# Patient Record
Sex: FEMALE | Race: WHITE | NOT HISPANIC OR LATINO | Employment: OTHER | URBAN - NONMETROPOLITAN AREA
[De-identification: names, ages, dates, MRNs, and addresses within clinical notes are randomized per-mention and may not be internally consistent; named-entity substitution may affect disease eponyms.]

---

## 2017-02-19 ENCOUNTER — APPOINTMENT (OUTPATIENT)
Dept: CT IMAGING | Facility: HOSPITAL | Age: 82
End: 2017-02-19

## 2017-02-19 ENCOUNTER — HOSPITAL ENCOUNTER (EMERGENCY)
Facility: HOSPITAL | Age: 82
Discharge: HOME | End: 2017-02-19
Attending: EMERGENCY MEDICINE | Admitting: EMERGENCY MEDICINE

## 2017-02-19 VITALS
SYSTOLIC BLOOD PRESSURE: 146 MMHG | RESPIRATION RATE: 18 BRPM | BODY MASS INDEX: 29.02 KG/M2 | TEMPERATURE: 97.9 F | WEIGHT: 170 LBS | HEART RATE: 82 BPM | DIASTOLIC BLOOD PRESSURE: 85 MMHG | HEIGHT: 64 IN | OXYGEN SATURATION: 96 %

## 2017-02-19 DIAGNOSIS — R20.0 NUMBNESS OF LEFT HAND: Primary | ICD-10-CM

## 2017-02-19 LAB
ANION GAP SERPL CALCULATED.3IONS-SCNC: 13.2 MMOL/L
BASOPHILS # BLD AUTO: 0.04 10*3/MM3 (ref 0–0.2)
BASOPHILS NFR BLD AUTO: 0.6 % (ref 0–2.5)
BUN BLD-MCNC: 13 MG/DL (ref 7–20)
BUN/CREAT SERPL: 14.4 (ref 7.1–23.5)
CALCIUM SPEC-SCNC: 9.2 MG/DL (ref 8.4–10.2)
CHLORIDE SERPL-SCNC: 103 MMOL/L (ref 98–107)
CO2 SERPL-SCNC: 29 MMOL/L (ref 26–30)
CREAT BLD-MCNC: 0.9 MG/DL (ref 0.6–1.3)
DEPRECATED RDW RBC AUTO: 43.7 FL (ref 37–54)
EOSINOPHIL # BLD AUTO: 0.09 10*3/MM3 (ref 0–0.7)
EOSINOPHIL NFR BLD AUTO: 1.4 % (ref 0–7)
ERYTHROCYTE [DISTWIDTH] IN BLOOD BY AUTOMATED COUNT: 12.8 % (ref 11.5–14.5)
GFR SERPL CREATININE-BSD FRML MDRD: 59 ML/MIN/1.73
GLUCOSE BLD-MCNC: 115 MG/DL (ref 74–98)
HCT VFR BLD AUTO: 38.6 % (ref 37–47)
HGB BLD-MCNC: 12.8 G/DL (ref 12–16)
HOLD SPECIMEN: NORMAL
HOLD SPECIMEN: NORMAL
IMM GRANULOCYTES # BLD: 0.03 10*3/MM3 (ref 0–0.06)
IMM GRANULOCYTES NFR BLD: 0.5 % (ref 0–0.6)
LYMPHOCYTES # BLD AUTO: 1.5 10*3/MM3 (ref 0.6–3.4)
LYMPHOCYTES NFR BLD AUTO: 23.4 % (ref 10–50)
MCH RBC QN AUTO: 30.8 PG (ref 27–31)
MCHC RBC AUTO-ENTMCNC: 33.2 G/DL (ref 30–37)
MCV RBC AUTO: 93 FL (ref 81–99)
MONOCYTES # BLD AUTO: 0.71 10*3/MM3 (ref 0–0.9)
MONOCYTES NFR BLD AUTO: 11.1 % (ref 0–12)
NEUTROPHILS # BLD AUTO: 4.03 10*3/MM3 (ref 2–6.9)
NEUTROPHILS NFR BLD AUTO: 63 % (ref 37–80)
NRBC BLD MANUAL-RTO: 0 /100 WBC (ref 0–0)
PLATELET # BLD AUTO: 294 10*3/MM3 (ref 130–400)
PMV BLD AUTO: 9.6 FL (ref 6–12)
POTASSIUM BLD-SCNC: 4.2 MMOL/L (ref 3.5–5.1)
RBC # BLD AUTO: 4.15 10*6/MM3 (ref 4.2–5.4)
SODIUM BLD-SCNC: 141 MMOL/L (ref 137–145)
TROPONIN I SERPL-MCNC: <0.012 NG/ML (ref 0–0.03)
WBC NRBC COR # BLD: 6.4 10*3/MM3 (ref 4.8–10.8)
WHOLE BLOOD HOLD SPECIMEN: NORMAL
WHOLE BLOOD HOLD SPECIMEN: NORMAL

## 2017-02-19 PROCEDURE — 99283 EMERGENCY DEPT VISIT LOW MDM: CPT

## 2017-02-19 PROCEDURE — 93005 ELECTROCARDIOGRAM TRACING: CPT | Performed by: EMERGENCY MEDICINE

## 2017-02-19 PROCEDURE — 84484 ASSAY OF TROPONIN QUANT: CPT | Performed by: EMERGENCY MEDICINE

## 2017-02-19 PROCEDURE — 70450 CT HEAD/BRAIN W/O DYE: CPT

## 2017-02-19 PROCEDURE — 85025 COMPLETE CBC W/AUTO DIFF WBC: CPT | Performed by: EMERGENCY MEDICINE

## 2017-02-19 PROCEDURE — 80048 BASIC METABOLIC PNL TOTAL CA: CPT | Performed by: EMERGENCY MEDICINE

## 2017-02-19 RX ORDER — LOSARTAN POTASSIUM 50 MG/1
50 TABLET ORAL DAILY
COMMUNITY
Start: 2013-03-06

## 2017-02-19 RX ORDER — PILOCARPINE HYDROCHLORIDE 10 MG/ML
1 SOLUTION/ DROPS OPHTHALMIC 4 TIMES DAILY
COMMUNITY

## 2017-02-19 RX ORDER — DILTIAZEM HYDROCHLORIDE 180 MG/1
180 CAPSULE, EXTENDED RELEASE ORAL DAILY
COMMUNITY
Start: 2013-03-19

## 2017-02-19 RX ORDER — FLUTICASONE PROPIONATE 50 MCG
1 SPRAY, SUSPENSION (ML) NASAL AS NEEDED
COMMUNITY
Start: 2013-04-23 | End: 2018-07-02

## 2017-02-19 RX ORDER — DABIGATRAN ETEXILATE 150 MG/1
150 CAPSULE ORAL DAILY
COMMUNITY
Start: 2013-03-27

## 2017-02-19 RX ORDER — PRAVASTATIN SODIUM 40 MG
20 TABLET ORAL DAILY
COMMUNITY
Start: 2013-09-24

## 2017-02-19 RX ORDER — LEVOTHYROXINE SODIUM 0.05 MG/1
50 TABLET ORAL DAILY
COMMUNITY
Start: 2013-03-01

## 2017-02-19 RX ORDER — CARVEDILOL 6.25 MG/1
6.25 TABLET ORAL 2 TIMES DAILY
COMMUNITY
Start: 2013-04-09

## 2017-02-19 RX ORDER — ALPRAZOLAM 0.25 MG/1
0.25 TABLET ORAL NIGHTLY PRN
COMMUNITY
Start: 2014-01-28

## 2017-02-19 NOTE — ED PROVIDER NOTES
Subjective   History of Present Illness   87-year-old female with a history of atrial fibrillation on Primaxin diltiazem here after her left hand to sleep.  Patient states she was sitting in a chair and felt normal prior to this, then afterwards her hand went limp.  She had difficulty moving it.  No associated numbness.  No other symptoms including no leg weakness or numbness, facial droop, didn't really speaking, or other concerns.  This lasted only a couple of minutes prior to improving.  Her family is very concerned about possible stroke given she has a strong family history for this.  Currently patient has no complaints.    Review of Systems   Neurological: Positive for weakness.   All other systems reviewed and are negative.      Past Medical History   Diagnosis Date   • Atrial fibrillation    • Disease of thyroid gland    • Hyperlipidemia    • Hypertension        Allergies   Allergen Reactions   • No Known Drug Allergy        Past Surgical History   Procedure Laterality Date   • Cyst removal       L breat and L ovary       History reviewed. No pertinent family history.    Social History     Social History   • Marital status:      Spouse name: N/A   • Number of children: N/A   • Years of education: N/A     Social History Main Topics   • Smoking status: Never Smoker   • Smokeless tobacco: None   • Alcohol use No   • Drug use: No   • Sexual activity: Not Asked     Other Topics Concern   • None     Social History Narrative   • None           Objective   Physical Exam   Constitutional: She is oriented to person, place, and time. She appears well-developed and well-nourished. No distress.   HENT:   Head: Normocephalic.   Mouth/Throat: Oropharynx is clear and moist. No oropharyngeal exudate.   Eyes: Conjunctivae are normal. No scleral icterus.   Neck: Neck supple. No tracheal deviation present.   Cardiovascular: Normal rate, regular rhythm, normal heart sounds and intact distal pulses.  Exam reveals no gallop  and no friction rub.    No murmur heard.  Pulmonary/Chest: Effort normal and breath sounds normal. No stridor. No respiratory distress. She has no wheezes. She has no rales. She exhibits no tenderness.   Abdominal: Soft. She exhibits no distension and no mass. There is no tenderness. There is no rebound and no guarding. No hernia.   Musculoskeletal: She exhibits no edema or deformity.   Neurological: She is alert and oriented to person, place, and time. No cranial nerve deficit.   NIH SS = 0   Skin: Skin is warm and dry. She is not diaphoretic. No erythema. No pallor.   Psychiatric: She has a normal mood and affect. Her behavior is normal.   Nursing note and vitals reviewed.      Procedures         ED Course  ED Course                  MDM   87-year-old female here with this in her left hand that was very transient nature and occurred after she had been sitting in a chair with her hand propped up on the rail.  Likely she had transient weakness secondary to decreased blood supply.  Unlikely she had a TIA given the Siegler nature of her symptoms and the short duration.  Discussed that this is still a possibility the family and that a complete workup would require an MRI and likely admission for further observation, however, they agreed to this is unlikely and preferred to get a CT scan to evaluate for bleeding, basic lab work and this is reassuring that would prefer discharge home with outpatient follow-up with a neurologist.      EKG: No ischemic changes, atrial fibrillation.    10:27 AM labs are unremarkable, CT scan shows no acute intracranial abnormality.  Discussed again that we cannot fully rule out possible stroke in this patient of the risk is likely low given her isolated symptoms.  Patient family comfortable with plan for discharge home.  Discussed strict return to care precautions.    Final diagnoses:   Numbness of left hand            Chris Sue MD  02/19/17 8284

## 2017-02-24 ENCOUNTER — APPOINTMENT (OUTPATIENT)
Dept: GENERAL RADIOLOGY | Facility: HOSPITAL | Age: 82
End: 2017-02-24

## 2017-02-24 ENCOUNTER — HOSPITAL ENCOUNTER (EMERGENCY)
Facility: HOSPITAL | Age: 82
Discharge: HOME | End: 2017-02-24
Attending: EMERGENCY MEDICINE | Admitting: EMERGENCY MEDICINE

## 2017-02-24 ENCOUNTER — APPOINTMENT (OUTPATIENT)
Dept: CT IMAGING | Facility: HOSPITAL | Age: 82
End: 2017-02-24

## 2017-02-24 VITALS
TEMPERATURE: 97.9 F | BODY MASS INDEX: 29.02 KG/M2 | DIASTOLIC BLOOD PRESSURE: 78 MMHG | RESPIRATION RATE: 18 BRPM | WEIGHT: 170 LBS | SYSTOLIC BLOOD PRESSURE: 147 MMHG | OXYGEN SATURATION: 94 % | HEIGHT: 64 IN | HEART RATE: 87 BPM

## 2017-02-24 DIAGNOSIS — R53.82 CHRONIC FATIGUE: Primary | ICD-10-CM

## 2017-02-24 LAB
ALBUMIN SERPL-MCNC: 4.1 G/DL (ref 3.5–5)
ALBUMIN/GLOB SERPL: 1.2 G/DL (ref 1–2)
ALP SERPL-CCNC: 83 U/L (ref 38–126)
ALT SERPL W P-5'-P-CCNC: 16 U/L (ref 13–69)
AMPHET+METHAMPHET UR QL: NEGATIVE
AMPHETAMINES UR QL: NEGATIVE
AMYLASE SERPL-CCNC: 74 U/L (ref 30–110)
ANION GAP SERPL CALCULATED.3IONS-SCNC: 13 MMOL/L
ARTERIAL PATENCY WRIST A: POSITIVE
AST SERPL-CCNC: 24 U/L (ref 15–46)
BACTERIA UR QL AUTO: ABNORMAL /HPF
BARBITURATES UR QL SCN: NEGATIVE
BASE EXCESS BLDA CALC-SCNC: 3.2 MMOL/L
BASOPHILS # BLD AUTO: 0.05 10*3/MM3 (ref 0–0.2)
BASOPHILS NFR BLD AUTO: 0.6 % (ref 0–2.5)
BDY SITE: ABNORMAL
BENZODIAZ UR QL SCN: NEGATIVE
BILIRUB SERPL-MCNC: 0.5 MG/DL (ref 0.2–1.3)
BILIRUB UR QL STRIP: NEGATIVE
BUN BLD-MCNC: 15 MG/DL (ref 7–20)
BUN/CREAT SERPL: 18.8 (ref 7.1–23.5)
BUPRENORPHINE SERPL-MCNC: NEGATIVE NG/ML
CALCIUM SPEC-SCNC: 9.1 MG/DL (ref 8.4–10.2)
CANNABINOIDS SERPL QL: NEGATIVE
CHLORIDE SERPL-SCNC: 103 MMOL/L (ref 98–107)
CLARITY UR: CLEAR
CO2 SERPL-SCNC: 29 MMOL/L (ref 26–30)
COCAINE UR QL: NEGATIVE
COHGB MFR BLD: 1 %
COLOR UR: YELLOW
CREAT BLD-MCNC: 0.8 MG/DL (ref 0.6–1.3)
DEPRECATED RDW RBC AUTO: 44.4 FL (ref 37–54)
EOSINOPHIL # BLD AUTO: 0.16 10*3/MM3 (ref 0–0.7)
EOSINOPHIL NFR BLD AUTO: 2.1 % (ref 0–7)
ERYTHROCYTE [DISTWIDTH] IN BLOOD BY AUTOMATED COUNT: 13 % (ref 11.5–14.5)
GFR SERPL CREATININE-BSD FRML MDRD: 68 ML/MIN/1.73
GLOBULIN UR ELPH-MCNC: 3.4 GM/DL
GLUCOSE BLD-MCNC: 122 MG/DL (ref 74–98)
GLUCOSE UR STRIP-MCNC: NEGATIVE MG/DL
HCO3 BLDA-SCNC: 27.4 MMOL/L (ref 22–28)
HCT VFR BLD AUTO: 37.8 % (ref 37–47)
HGB BLD-MCNC: 12.1 G/DL (ref 12–16)
HGB BLDA-MCNC: 12.8 G/DL (ref 12–18)
HGB UR QL STRIP.AUTO: ABNORMAL
HOROWITZ INDEX BLD+IHG-RTO: 21 %
HYALINE CASTS UR QL AUTO: ABNORMAL /LPF
IMM GRANULOCYTES # BLD: 0.04 10*3/MM3 (ref 0–0.06)
IMM GRANULOCYTES NFR BLD: 0.5 % (ref 0–0.6)
KETONES UR QL STRIP: NEGATIVE
LEUKOCYTE ESTERASE UR QL STRIP.AUTO: NEGATIVE
LIPASE SERPL-CCNC: 58 U/L (ref 23–300)
LYMPHOCYTES # BLD AUTO: 1.87 10*3/MM3 (ref 0.6–3.4)
LYMPHOCYTES NFR BLD AUTO: 24.3 % (ref 10–50)
MCH RBC QN AUTO: 29.9 PG (ref 27–31)
MCHC RBC AUTO-ENTMCNC: 32 G/DL (ref 30–37)
MCV RBC AUTO: 93.3 FL (ref 81–99)
METHADONE UR QL SCN: NEGATIVE
METHGB BLD QL: 0.7 %
MODALITY: ABNORMAL
MONOCYTES # BLD AUTO: 0.71 10*3/MM3 (ref 0–0.9)
MONOCYTES NFR BLD AUTO: 9.2 % (ref 0–12)
NEUTROPHILS # BLD AUTO: 4.88 10*3/MM3 (ref 2–6.9)
NEUTROPHILS NFR BLD AUTO: 63.3 % (ref 37–80)
NITRITE UR QL STRIP: NEGATIVE
NRBC BLD MANUAL-RTO: 0 /100 WBC (ref 0–0)
OPIATES UR QL: NEGATIVE
OXYCODONE UR QL SCN: NEGATIVE
OXYHGB MFR BLDV: 91.7 % (ref 94–99)
PCO2 BLDA: 40.8 MM HG (ref 35–45)
PCP UR QL SCN: NEGATIVE
PH BLDA: 7.44 PH UNITS
PH UR STRIP.AUTO: 6 [PH] (ref 5–8)
PLATELET # BLD AUTO: 280 10*3/MM3 (ref 130–400)
PMV BLD AUTO: 9.5 FL (ref 6–12)
PO2 BLDA: 63.9 MM HG (ref 75–100)
POTASSIUM BLD-SCNC: 4 MMOL/L (ref 3.5–5.1)
PROPOXYPH UR QL: NEGATIVE
PROT SERPL-MCNC: 7.5 G/DL (ref 6.3–8.2)
PROT UR QL STRIP: NEGATIVE
RBC # BLD AUTO: 4.05 10*6/MM3 (ref 4.2–5.4)
RBC # UR: ABNORMAL /HPF
REF LAB TEST METHOD: ABNORMAL
SODIUM BLD-SCNC: 141 MMOL/L (ref 137–145)
SP GR UR STRIP: 1.02 (ref 1–1.03)
SQUAMOUS #/AREA URNS HPF: ABNORMAL /HPF
TRICYCLICS UR QL SCN: NEGATIVE
TROPONIN I SERPL-MCNC: <0.012 NG/ML (ref 0–0.03)
TSH SERPL DL<=0.05 MIU/L-ACNC: 1.79 MIU/ML (ref 0.47–4.68)
UROBILINOGEN UR QL STRIP: ABNORMAL
WBC NRBC COR # BLD: 7.71 10*3/MM3 (ref 4.8–10.8)
WBC UR QL AUTO: ABNORMAL /HPF

## 2017-02-24 PROCEDURE — 80306 DRUG TEST PRSMV INSTRMNT: CPT | Performed by: EMERGENCY MEDICINE

## 2017-02-24 PROCEDURE — 70450 CT HEAD/BRAIN W/O DYE: CPT

## 2017-02-24 PROCEDURE — 82375 ASSAY CARBOXYHB QUANT: CPT

## 2017-02-24 PROCEDURE — 83690 ASSAY OF LIPASE: CPT | Performed by: EMERGENCY MEDICINE

## 2017-02-24 PROCEDURE — 81001 URINALYSIS AUTO W/SCOPE: CPT | Performed by: EMERGENCY MEDICINE

## 2017-02-24 PROCEDURE — 36415 COLL VENOUS BLD VENIPUNCTURE: CPT

## 2017-02-24 PROCEDURE — 85025 COMPLETE CBC W/AUTO DIFF WBC: CPT | Performed by: EMERGENCY MEDICINE

## 2017-02-24 PROCEDURE — 83050 HGB METHEMOGLOBIN QUAN: CPT

## 2017-02-24 PROCEDURE — 99284 EMERGENCY DEPT VISIT MOD MDM: CPT

## 2017-02-24 PROCEDURE — 93005 ELECTROCARDIOGRAM TRACING: CPT | Performed by: EMERGENCY MEDICINE

## 2017-02-24 PROCEDURE — 82150 ASSAY OF AMYLASE: CPT | Performed by: EMERGENCY MEDICINE

## 2017-02-24 PROCEDURE — 71010 HC CHEST PA OR AP: CPT

## 2017-02-24 PROCEDURE — 84443 ASSAY THYROID STIM HORMONE: CPT | Performed by: EMERGENCY MEDICINE

## 2017-02-24 PROCEDURE — 84484 ASSAY OF TROPONIN QUANT: CPT | Performed by: EMERGENCY MEDICINE

## 2017-02-24 PROCEDURE — 36600 WITHDRAWAL OF ARTERIAL BLOOD: CPT

## 2017-02-24 PROCEDURE — P9612 CATHETERIZE FOR URINE SPEC: HCPCS

## 2017-02-24 PROCEDURE — 80053 COMPREHEN METABOLIC PANEL: CPT | Performed by: EMERGENCY MEDICINE

## 2017-02-24 PROCEDURE — 87040 BLOOD CULTURE FOR BACTERIA: CPT | Performed by: EMERGENCY MEDICINE

## 2017-02-24 PROCEDURE — 82805 BLOOD GASES W/O2 SATURATION: CPT

## 2017-02-24 RX ORDER — SODIUM CHLORIDE 0.9 % (FLUSH) 0.9 %
10 SYRINGE (ML) INJECTION AS NEEDED
Status: DISCONTINUED | OUTPATIENT
Start: 2017-02-24 | End: 2017-02-25 | Stop reason: HOSPADM

## 2017-02-25 LAB
HOLD SPECIMEN: NORMAL
WHOLE BLOOD HOLD SPECIMEN: NORMAL
WHOLE BLOOD HOLD SPECIMEN: NORMAL

## 2017-03-01 LAB — BACTERIA SPEC AEROBE CULT: NORMAL

## 2017-04-19 ENCOUNTER — HOSPITAL ENCOUNTER (EMERGENCY)
Facility: HOSPITAL | Age: 82
Discharge: LEFT BEFORE DISCHARGE | End: 2017-04-19
Attending: STUDENT IN AN ORGANIZED HEALTH CARE EDUCATION/TRAINING PROGRAM | Admitting: STUDENT IN AN ORGANIZED HEALTH CARE EDUCATION/TRAINING PROGRAM

## 2017-04-19 VITALS
DIASTOLIC BLOOD PRESSURE: 96 MMHG | OXYGEN SATURATION: 95 % | HEART RATE: 105 BPM | WEIGHT: 170 LBS | HEIGHT: 64 IN | TEMPERATURE: 97.5 F | SYSTOLIC BLOOD PRESSURE: 181 MMHG | RESPIRATION RATE: 20 BRPM | BODY MASS INDEX: 29.02 KG/M2

## 2017-04-19 DIAGNOSIS — R42 VERTIGO: Primary | ICD-10-CM

## 2017-04-19 LAB
ALBUMIN SERPL-MCNC: 4.8 G/DL (ref 3.5–5)
ALBUMIN/GLOB SERPL: 1.3 G/DL (ref 1–2)
ALP SERPL-CCNC: 100 U/L (ref 38–126)
ALT SERPL W P-5'-P-CCNC: 21 U/L (ref 13–69)
ANION GAP SERPL CALCULATED.3IONS-SCNC: 16.2 MMOL/L
AST SERPL-CCNC: 24 U/L (ref 15–46)
BASOPHILS # BLD AUTO: 0.05 10*3/MM3 (ref 0–0.2)
BASOPHILS NFR BLD AUTO: 0.6 % (ref 0–2.5)
BILIRUB SERPL-MCNC: 0.6 MG/DL (ref 0.2–1.3)
BUN BLD-MCNC: 19 MG/DL (ref 7–20)
BUN/CREAT SERPL: 21.1 (ref 7.1–23.5)
CALCIUM SPEC-SCNC: 9.6 MG/DL (ref 8.4–10.2)
CHLORIDE SERPL-SCNC: 104 MMOL/L (ref 98–107)
CO2 SERPL-SCNC: 26 MMOL/L (ref 26–30)
CREAT BLD-MCNC: 0.9 MG/DL (ref 0.6–1.3)
DEPRECATED RDW RBC AUTO: 44.6 FL (ref 37–54)
EOSINOPHIL # BLD AUTO: 0.11 10*3/MM3 (ref 0–0.7)
EOSINOPHIL NFR BLD AUTO: 1.3 % (ref 0–7)
ERYTHROCYTE [DISTWIDTH] IN BLOOD BY AUTOMATED COUNT: 13 % (ref 11.5–14.5)
GFR SERPL CREATININE-BSD FRML MDRD: 59 ML/MIN/1.73
GLOBULIN UR ELPH-MCNC: 3.7 GM/DL
GLUCOSE BLD-MCNC: 104 MG/DL (ref 74–98)
HCT VFR BLD AUTO: 43.5 % (ref 37–47)
HGB BLD-MCNC: 14.1 G/DL (ref 12–16)
HOLD SPECIMEN: NORMAL
HOLD SPECIMEN: NORMAL
IMM GRANULOCYTES # BLD: 0.04 10*3/MM3 (ref 0–0.06)
IMM GRANULOCYTES NFR BLD: 0.5 % (ref 0–0.6)
LYMPHOCYTES # BLD AUTO: 2.37 10*3/MM3 (ref 0.6–3.4)
LYMPHOCYTES NFR BLD AUTO: 27.8 % (ref 10–50)
MCH RBC QN AUTO: 30.3 PG (ref 27–31)
MCHC RBC AUTO-ENTMCNC: 32.4 G/DL (ref 30–37)
MCV RBC AUTO: 93.5 FL (ref 81–99)
MONOCYTES # BLD AUTO: 0.76 10*3/MM3 (ref 0–0.9)
MONOCYTES NFR BLD AUTO: 8.9 % (ref 0–12)
NEUTROPHILS # BLD AUTO: 5.19 10*3/MM3 (ref 2–6.9)
NEUTROPHILS NFR BLD AUTO: 60.9 % (ref 37–80)
NRBC BLD MANUAL-RTO: 0 /100 WBC (ref 0–0)
PLATELET # BLD AUTO: 290 10*3/MM3 (ref 130–400)
PMV BLD AUTO: 10.7 FL (ref 6–12)
POTASSIUM BLD-SCNC: 4.2 MMOL/L (ref 3.5–5.1)
PROT SERPL-MCNC: 8.5 G/DL (ref 6.3–8.2)
RBC # BLD AUTO: 4.65 10*6/MM3 (ref 4.2–5.4)
SODIUM BLD-SCNC: 142 MMOL/L (ref 137–145)
WBC NRBC COR # BLD: 8.52 10*3/MM3 (ref 4.8–10.8)
WHOLE BLOOD HOLD SPECIMEN: NORMAL
WHOLE BLOOD HOLD SPECIMEN: NORMAL

## 2017-04-19 PROCEDURE — 99284 EMERGENCY DEPT VISIT MOD MDM: CPT

## 2017-04-19 PROCEDURE — 85025 COMPLETE CBC W/AUTO DIFF WBC: CPT | Performed by: STUDENT IN AN ORGANIZED HEALTH CARE EDUCATION/TRAINING PROGRAM

## 2017-04-19 PROCEDURE — 93005 ELECTROCARDIOGRAM TRACING: CPT | Performed by: STUDENT IN AN ORGANIZED HEALTH CARE EDUCATION/TRAINING PROGRAM

## 2017-04-19 PROCEDURE — 80053 COMPREHEN METABOLIC PANEL: CPT | Performed by: STUDENT IN AN ORGANIZED HEALTH CARE EDUCATION/TRAINING PROGRAM

## 2017-04-19 RX ORDER — MECLIZINE HYDROCHLORIDE 25 MG/1
25 TABLET ORAL ONCE
Status: COMPLETED | OUTPATIENT
Start: 2017-04-19 | End: 2017-04-19

## 2017-04-19 RX ORDER — MECLIZINE HYDROCHLORIDE 25 MG/1
25 TABLET ORAL 4 TIMES DAILY PRN
Qty: 20 TABLET | Refills: 0 | Status: SHIPPED | OUTPATIENT
Start: 2017-04-19

## 2017-04-19 RX ORDER — SODIUM CHLORIDE 0.9 % (FLUSH) 0.9 %
10 SYRINGE (ML) INJECTION AS NEEDED
Status: DISCONTINUED | OUTPATIENT
Start: 2017-04-19 | End: 2017-04-19 | Stop reason: HOSPADM

## 2017-04-19 RX ADMIN — MECLIZINE HYDROCHLORIDE 25 MG: 25 TABLET ORAL at 14:19

## 2017-04-19 NOTE — ED NOTES
PT REQUESTS TO LEAVE. DR. PERDOMO NOTIFIED. AMA FORM PRINTED     Joanie Booker, RN  04/19/17 2549

## 2017-04-19 NOTE — ED PROVIDER NOTES
Subjective   HPI Comments: 87-year-old female brought in by her daughter for vertigo and difficulty walking.  Patient's symptoms started earlier today.  She does have a history of vertigo and does use meclizine for this.  States she did not take any of her meclizine today.  Patient's daughter states she had another episode last night that did resolve spontaneously.  She became concerned today because this was the second episode within 24 hours as well as she states her mother had difficulty ambulating because of her vertigo.  Patient denies true focal weakness states she had trouble keeping her balance.  Patient also reports that she feels normal now.      Review of Systems   All other systems reviewed and are negative.      Past Medical History:   Diagnosis Date   • Atrial fibrillation    • Atrial fibrillation    • Dementia    • Disease of thyroid gland    • Hyperlipidemia    • Hypertension    • Vertigo        Allergies   Allergen Reactions   • No Known Drug Allergy        Past Surgical History:   Procedure Laterality Date   • BREAST SURGERY     • CYST REMOVAL      L breat and L ovary       History reviewed. No pertinent family history.    Social History     Social History   • Marital status:      Spouse name: N/A   • Number of children: N/A   • Years of education: N/A     Social History Main Topics   • Smoking status: Never Smoker   • Smokeless tobacco: None   • Alcohol use No   • Drug use: No   • Sexual activity: Not Asked     Other Topics Concern   • None     Social History Narrative           Objective   Physical Exam   Nursing note and vitals reviewed.    GEN: No acute distress  Head: Normocephalic, atraumatic  Eyes: Pupils equal round reactive to light  ENT: Posterior pharynx normal in appearance, oral mucosa is moist  Chest: Nontender to palpation  Cardiovascular: Regular rate  Lungs: Clear to auscultation bilaterally  Abdomen: Soft, nontender, nondistended, no peritoneal signs  Extremities: No edema,  normal appearance  Neuro: GCS 15, alert and oriented ×3, cranial nerves II through XII grossly intact, strength 5 out of 5 bilaterally in upper and lower extremities, no dysmetria, no aphasia or dysarthria, sensation grossly intact in all 4 extremities to light touch  Psych: Mood and affect are appropriate  Procedures         ED Course  ED Course   Comment By Time   EKG shows atrial fibrillation with rapid ventricular response. rate 112  low qrs voltage in chest leads.  Abnormal  ecg. Vinod Lopez MD 04/19 1342                  MDM  Number of Diagnoses or Management Options  Vertigo:   Diagnosis management comments: Differential diagnosis would include vertebrobasilar insufficiency, benign positional vertigo, vestibulitis, Ménière's disease, intracranial neoplasm, metabolic abnormality or concerns  Has a history of vertigo was treated with meclizine successfully.  She had not tried her meclizine today.  He states that she feels much better now and wishes to go home.  Explain that they are ready to go she states that she would not come in if she felt this way initially.  This applied additional prescription of meclizine.       Amount and/or Complexity of Data Reviewed  Clinical lab tests: ordered and reviewed        Final diagnoses:   Vertigo            Vinod Lopez MD  04/19/17 9049

## 2017-04-19 NOTE — ED NOTES
PT AGAIN PUT TOILET PAPER IN COLLECTION AREA. PT STATES SHE JUST WANTS TO GO HOME, THAT SHE FEELS BETTER AND HER LEGS ARE NO LONGER WEAK.      Joanie Booker RN  04/19/17 1175

## 2017-04-19 NOTE — ED NOTES
PT ASSISTED WITH BSC AGAIN, AND TOLD TO NOT PUT HER TOILET PAPER IN AFTER SHE WIPES.      Joanie Booker, RN  04/19/17 4090

## 2017-04-19 NOTE — ED NOTES
PT GIVEN BSC AND ASSISTED BY MYSELF. PT CONTAMINATED SPECIMEN BY PLACING TOILET PAPER IN COLLECTION AREA.     Joanie Booker, STEVEN  04/19/17 6599

## 2017-06-07 ENCOUNTER — TRANSCRIBE ORDERS (OUTPATIENT)
Dept: ADMINISTRATIVE | Facility: HOSPITAL | Age: 82
End: 2017-06-07

## 2017-06-07 DIAGNOSIS — Z78.0 ASYMPTOMATIC POSTMENOPAUSAL STATUS (AGE-RELATED) (NATURAL): Primary | ICD-10-CM

## 2017-06-21 ENCOUNTER — APPOINTMENT (OUTPATIENT)
Dept: BONE DENSITY | Facility: HOSPITAL | Age: 82
End: 2017-06-21

## 2018-04-04 ENCOUNTER — APPOINTMENT (OUTPATIENT)
Dept: GENERAL RADIOLOGY | Facility: HOSPITAL | Age: 83
End: 2018-04-04

## 2018-04-04 ENCOUNTER — HOSPITAL ENCOUNTER (EMERGENCY)
Facility: HOSPITAL | Age: 83
Discharge: HOME | End: 2018-04-04
Attending: EMERGENCY MEDICINE | Admitting: EMERGENCY MEDICINE

## 2018-04-04 ENCOUNTER — APPOINTMENT (OUTPATIENT)
Dept: CT IMAGING | Facility: HOSPITAL | Age: 83
End: 2018-04-04

## 2018-04-04 VITALS
DIASTOLIC BLOOD PRESSURE: 86 MMHG | OXYGEN SATURATION: 99 % | WEIGHT: 170 LBS | TEMPERATURE: 97.5 F | SYSTOLIC BLOOD PRESSURE: 171 MMHG | BODY MASS INDEX: 29.02 KG/M2 | HEIGHT: 64 IN | HEART RATE: 98 BPM | RESPIRATION RATE: 16 BRPM

## 2018-04-04 DIAGNOSIS — G44.039 NONINTRACTABLE PAROXYSMAL HEMICRANIA, UNSPECIFIED CHRONICITY PATTERN: ICD-10-CM
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
## 2018-04-04 DIAGNOSIS — N39.0 URINARY TRACT INFECTION WITHOUT HEMATURIA, SITE UNSPECIFIED: ICD-10-CM

## 2018-04-04 LAB
ALBUMIN SERPL-MCNC: 4.3 G/DL (ref 3.2–4.8)
ALBUMIN/GLOB SERPL: 1.4 G/DL (ref 1.5–2.5)
ALP SERPL-CCNC: 96 U/L (ref 25–100)
ALT SERPL W P-5'-P-CCNC: 17 U/L (ref 7–40)
ANION GAP SERPL CALCULATED.3IONS-SCNC: 9 MMOL/L (ref 3–11)
AST SERPL-CCNC: 21 U/L (ref 0–33)
BACTERIA UR QL AUTO: ABNORMAL /HPF
BASOPHILS # BLD AUTO: 0.02 10*3/MM3 (ref 0–0.2)
BASOPHILS NFR BLD AUTO: 0.2 % (ref 0–1)
BILIRUB SERPL-MCNC: 0.5 MG/DL (ref 0.3–1.2)
BILIRUB UR QL STRIP: NEGATIVE
BUN BLD-MCNC: 18 MG/DL (ref 9–23)
BUN/CREAT SERPL: 22.5 (ref 7–25)
CALCIUM SPEC-SCNC: 9.1 MG/DL (ref 8.7–10.4)
CHLORIDE SERPL-SCNC: 103 MMOL/L (ref 99–109)
CLARITY UR: ABNORMAL
CO2 SERPL-SCNC: 28 MMOL/L (ref 20–31)
COLOR UR: YELLOW
CREAT BLD-MCNC: 0.8 MG/DL (ref 0.6–1.3)
DEPRECATED RDW RBC AUTO: 46.2 FL (ref 37–54)
EOSINOPHIL # BLD AUTO: 0.09 10*3/MM3 (ref 0–0.3)
EOSINOPHIL NFR BLD AUTO: 0.9 % (ref 0–3)
ERYTHROCYTE [DISTWIDTH] IN BLOOD BY AUTOMATED COUNT: 13.7 % (ref 11.3–14.5)
GFR SERPL CREATININE-BSD FRML MDRD: 68 ML/MIN/1.73
GLOBULIN UR ELPH-MCNC: 3.1 GM/DL
GLUCOSE BLD-MCNC: 110 MG/DL (ref 70–100)
GLUCOSE UR STRIP-MCNC: NEGATIVE MG/DL
HCT VFR BLD AUTO: 40 % (ref 34.5–44)
HGB BLD-MCNC: 13 G/DL (ref 11.5–15.5)
HGB UR QL STRIP.AUTO: NEGATIVE
HYALINE CASTS UR QL AUTO: ABNORMAL /LPF
IMM GRANULOCYTES # BLD: 0.04 10*3/MM3 (ref 0–0.03)
IMM GRANULOCYTES NFR BLD: 0.4 % (ref 0–0.6)
KETONES UR QL STRIP: NEGATIVE
LEUKOCYTE ESTERASE UR QL STRIP.AUTO: ABNORMAL
LYMPHOCYTES # BLD AUTO: 1.31 10*3/MM3 (ref 0.6–4.8)
LYMPHOCYTES NFR BLD AUTO: 13.4 % (ref 24–44)
MCH RBC QN AUTO: 30 PG (ref 27–31)
MCHC RBC AUTO-ENTMCNC: 32.5 G/DL (ref 32–36)
MCV RBC AUTO: 92.4 FL (ref 80–99)
MONOCYTES # BLD AUTO: 0.74 10*3/MM3 (ref 0–1)
MONOCYTES NFR BLD AUTO: 7.5 % (ref 0–12)
NEUTROPHILS # BLD AUTO: 7.61 10*3/MM3 (ref 1.5–8.3)
NEUTROPHILS NFR BLD AUTO: 77.6 % (ref 41–71)
NITRITE UR QL STRIP: NEGATIVE
PH UR STRIP.AUTO: 7 [PH] (ref 5–8)
PLATELET # BLD AUTO: 292 10*3/MM3 (ref 150–450)
PMV BLD AUTO: 10 FL (ref 6–12)
POTASSIUM BLD-SCNC: 3.9 MMOL/L (ref 3.5–5.5)
PROT SERPL-MCNC: 7.4 G/DL (ref 5.7–8.2)
PROT UR QL STRIP: NEGATIVE
RBC # BLD AUTO: 4.33 10*6/MM3 (ref 3.89–5.14)
RBC # UR: ABNORMAL /HPF
REF LAB TEST METHOD: ABNORMAL
SODIUM BLD-SCNC: 140 MMOL/L (ref 132–146)
SP GR UR STRIP: 1.02 (ref 1–1.03)
SQUAMOUS #/AREA URNS HPF: ABNORMAL /HPF
TROPONIN I SERPL-MCNC: 0.02 NG/ML (ref 0–0.07)
UROBILINOGEN UR QL STRIP: ABNORMAL
WBC NRBC COR # BLD: 9.81 10*3/MM3 (ref 3.5–10.8)
WBC UR QL AUTO: ABNORMAL /HPF

## 2018-04-04 PROCEDURE — 85025 COMPLETE CBC W/AUTO DIFF WBC: CPT | Performed by: EMERGENCY MEDICINE

## 2018-04-04 PROCEDURE — 84484 ASSAY OF TROPONIN QUANT: CPT

## 2018-04-04 PROCEDURE — 96365 THER/PROPH/DIAG IV INF INIT: CPT

## 2018-04-04 PROCEDURE — 99284 EMERGENCY DEPT VISIT MOD MDM: CPT

## 2018-04-04 PROCEDURE — 72131 CT LUMBAR SPINE W/O DYE: CPT

## 2018-04-04 PROCEDURE — 72128 CT CHEST SPINE W/O DYE: CPT

## 2018-04-04 PROCEDURE — 71045 X-RAY EXAM CHEST 1 VIEW: CPT

## 2018-04-04 PROCEDURE — 72125 CT NECK SPINE W/O DYE: CPT

## 2018-04-04 PROCEDURE — 81001 URINALYSIS AUTO W/SCOPE: CPT | Performed by: EMERGENCY MEDICINE

## 2018-04-04 PROCEDURE — 25010000002 CEFTRIAXONE PER 250 MG: Performed by: EMERGENCY MEDICINE

## 2018-04-04 PROCEDURE — 80053 COMPREHEN METABOLIC PANEL: CPT | Performed by: EMERGENCY MEDICINE

## 2018-04-04 PROCEDURE — 87086 URINE CULTURE/COLONY COUNT: CPT | Performed by: EMERGENCY MEDICINE

## 2018-04-04 PROCEDURE — 70450 CT HEAD/BRAIN W/O DYE: CPT

## 2018-04-04 PROCEDURE — 72170 X-RAY EXAM OF PELVIS: CPT

## 2018-04-04 PROCEDURE — 93005 ELECTROCARDIOGRAM TRACING: CPT | Performed by: EMERGENCY MEDICINE

## 2018-04-04 RX ORDER — CEFDINIR 300 MG/1
300 CAPSULE ORAL 2 TIMES DAILY
Qty: 14 CAPSULE | Refills: 0 | Status: SHIPPED | OUTPATIENT
Start: 2018-04-04 | End: 2018-04-11

## 2018-04-04 RX ORDER — RANITIDINE 300 MG/1
300 TABLET ORAL 2 TIMES DAILY PRN
COMMUNITY

## 2018-04-04 RX ORDER — CEFTRIAXONE SODIUM 1 G/50ML
1 INJECTION, SOLUTION INTRAVENOUS ONCE
Status: COMPLETED | OUTPATIENT
Start: 2018-04-04 | End: 2018-04-04

## 2018-04-04 RX ORDER — CITALOPRAM 20 MG/1
20 TABLET ORAL DAILY
COMMUNITY

## 2018-04-04 RX ORDER — MORPHINE SULFATE 2 MG/ML
2 INJECTION, SOLUTION INTRAMUSCULAR; INTRAVENOUS ONCE
Status: DISCONTINUED | OUTPATIENT
Start: 2018-04-04 | End: 2018-04-04 | Stop reason: HOSPADM

## 2018-04-04 RX ORDER — POTASSIUM CHLORIDE 1500 MG/1
20 TABLET, FILM COATED, EXTENDED RELEASE ORAL DAILY
COMMUNITY

## 2018-04-04 RX ADMIN — CEFTRIAXONE SODIUM 1 G: 1 INJECTION, SOLUTION INTRAVENOUS at 13:48

## 2018-04-04 NOTE — ED PROVIDER NOTES
"Subjective   Perla Ivan is a demented 88 y.o.female who presents to the emergency department with complaints of a fall. When asked what brought her to the ED today, the patient says \"I think I fell but I can't remember too much about it\". She is unable to recall why she fell and cannot provide any details regarding the event. She was having a frontal headache earlier today that has improved since onset. She also reports a recent cough but denies neck pain, back pain, chest pain, difficulty breathing, abdominal pain, nausea, or vomiting. There are no other known complaints at this time.         History provided by:  Patient  History limited by:  Dementia  Fall   Mechanism of injury: fall    Incident location:  Home  Prior to arrival data:     Amnesic to event: yes    Associated symptoms: headaches    Associated symptoms: no abdominal pain, no back pain, no chest pain, no difficulty breathing, no nausea, no neck pain and no vomiting        Review of Systems   Unable to perform ROS: Dementia   Respiratory: Positive for cough.    Cardiovascular: Negative for chest pain.   Gastrointestinal: Negative for abdominal pain, nausea and vomiting.   Musculoskeletal: Negative for back pain and neck pain.   Neurological: Positive for headaches.       Past Medical History:   Diagnosis Date   • Atrial fibrillation    • Atrial fibrillation    • Dementia    • Disease of thyroid gland    • Hyperlipidemia    • Hypertension    • Vertigo        Allergies   Allergen Reactions   • No Known Drug Allergy        Past Surgical History:   Procedure Laterality Date   • BREAST SURGERY     • CYST REMOVAL      L breat and L ovary       History reviewed. No pertinent family history.    Social History     Social History   • Marital status:      Social History Main Topics   • Smoking status: Never Smoker   • Alcohol use No   • Drug use: No     Other Topics Concern   • Not on file         Objective   Physical Exam   Constitutional: She is " oriented to person, place, and time. She appears well-developed and well-nourished. No distress.   She is awake and akert. Answers questions appropriately.   HENT:   Head: Normocephalic and atraumatic.   Nose: Nose normal.   Mouth/Throat: Oropharynx is clear and moist.   No signs of trauma to the face or scalp.   Eyes: Conjunctivae are normal. No scleral icterus.   Neck: Normal range of motion. Neck supple.   Cardiovascular: Normal rate, regular rhythm and normal heart sounds.    No murmur heard.  Pulmonary/Chest: Effort normal and breath sounds normal. No respiratory distress.   Abdominal: Soft. Bowel sounds are normal. She exhibits no distension and no mass. There is no tenderness. There is no rebound and no guarding.   Musculoskeletal: She exhibits edema and tenderness.   On examination of the spine she has upper and mid thoracic midline tenderness to palpation. No signs of external trauma to the back. She reports some mild tenderness to palpation of the bilateral hips. No pain to palpation of the bilateral upper extremities with normal range of motion. No pain throughout the rest of the lower extremities. She does have chronic 3+ edema to the bilateral lower extremities up to the knees. Anterior lower extremity venous stasis dermatitis bilaterally.      Neurological: She is alert and oriented to person, place, and time.   Skin: Skin is warm and dry. No erythema.   Psychiatric: She has a normal mood and affect. Her behavior is normal.   Nursing note and vitals reviewed.      Procedures         ED Course  ED Course   Comment By Time   Dr. Salazar is at the bedside reevaluating the patient. The patient is resting comfortably. Discussed findings and plan with the patient and her family who are now at the bedside. All are agreeable with discharge home. Juli Sung 04/04 1318     Recent Results (from the past 24 hour(s))   Urinalysis With / Culture If Indicated - Urine, Clean Catch    Collection Time: 04/04/18  11:44 AM   Result Value Ref Range    Color, UA Yellow Yellow, Straw    Appearance, UA Cloudy (A) Clear    pH, UA 7.0 5.0 - 8.0    Specific Gravity, UA 1.017 1.001 - 1.030    Glucose, UA Negative Negative    Ketones, UA Negative Negative    Bilirubin, UA Negative Negative    Blood, UA Negative Negative    Protein, UA Negative Negative    Leuk Esterase, UA Large (3+) (A) Negative    Nitrite, UA Negative Negative    Urobilinogen, UA 0.2 E.U./dL 0.2 - 1.0 E.U./dL   Urinalysis, Microscopic Only - Urine, Clean Catch    Collection Time: 04/04/18 11:44 AM   Result Value Ref Range    RBC, UA 0-2 None Seen, 0-2 /HPF    WBC, UA 31-50 (A) None Seen, 0-2 /HPF    Bacteria, UA 3+ (A) None Seen, Trace /HPF    Squamous Epithelial Cells, UA 21-30 (A) None Seen, 0-2 /HPF    Hyaline Casts, UA 0-6 0 - 6 /LPF    Methodology Automated Microscopy    CBC Auto Differential    Collection Time: 04/04/18 11:52 AM   Result Value Ref Range    WBC 9.81 3.50 - 10.80 10*3/mm3    RBC 4.33 3.89 - 5.14 10*6/mm3    Hemoglobin 13.0 11.5 - 15.5 g/dL    Hematocrit 40.0 34.5 - 44.0 %    MCV 92.4 80.0 - 99.0 fL    MCH 30.0 27.0 - 31.0 pg    MCHC 32.5 32.0 - 36.0 g/dL    RDW 13.7 11.3 - 14.5 %    RDW-SD 46.2 37.0 - 54.0 fl    MPV 10.0 6.0 - 12.0 fL    Platelets 292 150 - 450 10*3/mm3    Neutrophil % 77.6 (H) 41.0 - 71.0 %    Lymphocyte % 13.4 (L) 24.0 - 44.0 %    Monocyte % 7.5 0.0 - 12.0 %    Eosinophil % 0.9 0.0 - 3.0 %    Basophil % 0.2 0.0 - 1.0 %    Immature Grans % 0.4 0.0 - 0.6 %    Neutrophils, Absolute 7.61 1.50 - 8.30 10*3/mm3    Lymphocytes, Absolute 1.31 0.60 - 4.80 10*3/mm3    Monocytes, Absolute 0.74 0.00 - 1.00 10*3/mm3    Eosinophils, Absolute 0.09 0.00 - 0.30 10*3/mm3    Basophils, Absolute 0.02 0.00 - 0.20 10*3/mm3    Immature Grans, Absolute 0.04 (H) 0.00 - 0.03 10*3/mm3   POC Troponin, Rapid    Collection Time: 04/04/18 12:15 PM   Result Value Ref Range    Troponin I 0.02 0.00 - 0.07 ng/mL   Comprehensive Metabolic Panel    Collection  Time: 04/04/18 12:30 PM   Result Value Ref Range    Glucose 110 (H) 70 - 100 mg/dL    BUN 18 9 - 23 mg/dL    Creatinine 0.80 0.60 - 1.30 mg/dL    Sodium 140 132 - 146 mmol/L    Potassium 3.9 3.5 - 5.5 mmol/L    Chloride 103 99 - 109 mmol/L    CO2 28.0 20.0 - 31.0 mmol/L    Calcium 9.1 8.7 - 10.4 mg/dL    Total Protein 7.4 5.7 - 8.2 g/dL    Albumin 4.30 3.20 - 4.80 g/dL    ALT (SGPT) 17 7 - 40 U/L    AST (SGOT) 21 0 - 33 U/L    Alkaline Phosphatase 96 25 - 100 U/L    Total Bilirubin 0.5 0.3 - 1.2 mg/dL    eGFR Non African Amer 68 >60 mL/min/1.73    Globulin 3.1 gm/dL    A/G Ratio 1.4 (L) 1.5 - 2.5 g/dL    BUN/Creatinine Ratio 22.5 7.0 - 25.0    Anion Gap 9.0 3.0 - 11.0 mmol/L     Note: In addition to lab results from this visit, the labs listed above may include labs taken at another facility or during a different encounter within the last 24 hours. Please correlate lab times with ED admission and discharge times for further clarification of the services performed during this visit.    CT Cervical Spine Without Contrast   Preliminary Result   No evidence for acute fracture or osseous malalignment of   the visualized spine with associated degenerative changes as detailed   above along with spondylitic changes of the lumbar spine greatest at the   L4-L5 level where there is moderate spinal canal stenosis and moderate   to severe left neuroforaminal stenosis.       D:  04/04/2018   E:  04/04/2018          CT Thoracic Spine Without Contrast   Preliminary Result   No evidence for acute fracture or osseous malalignment of   the visualized spine with associated degenerative changes as detailed   above along with spondylitic changes of the lumbar spine greatest at the   L4-L5 level where there is moderate spinal canal stenosis and moderate   to severe left neuroforaminal stenosis.       D:  04/04/2018   E:  04/04/2018          CT Lumbar Spine Without Contrast   Preliminary Result   No evidence for acute fracture or osseous  "malalignment of   the visualized spine with associated degenerative changes as detailed   above along with spondylitic changes of the lumbar spine greatest at the   L4-L5 level where there is moderate spinal canal stenosis and moderate   to severe left neuroforaminal stenosis.       D:  04/04/2018   E:  04/04/2018          CT Head Without Contrast   Preliminary Result   1. No acute intracranial abnormality.   2. Indeterminate calcific density in the right paramedian suprasellar   region may represent vascular origin given somewhat tubular appearance   however incompletely evaluated on current exam.              XR Pelvis 1 or 2 View   Preliminary Result   No acute osseous abnormality specifically no displaced   pelvic ring fracture on single AP image evaluation.              XR Chest 1 View   Preliminary Result   Cardiomegaly with minimal increase in central pulmonary   vascularity however no focal consolidation or stomach and pleural   effusion.                Vitals:    04/04/18 1119 04/04/18 1157 04/04/18 1302   BP: (!) 189/99 (!) 172/133 (!) 180/118   Patient Position: Sitting     Pulse: 94 89 106   Resp: 16     Temp: 97.5 °F (36.4 °C)     TempSrc: Oral     SpO2: 92% 98% 98%   Weight: 77.1 kg (170 lb)     Height: 162.6 cm (64\")       Medications   Morphine sulfate (PF) injection 2 mg (2 mg Intravenous Not Given 4/4/18 1158)   cefTRIAXone (ROCEPHIN) IVPB 1 g (not administered)     ECG/EMG Results (last 24 hours)     ** No results found for the last 24 hours. **                       MDM  Number of Diagnoses or Management Options  Fall, initial encounter: new and requires workup  Nonintractable paroxysmal hemicrania, unspecified chronicity pattern: new and requires workup  Urinary tract infection without hematuria, site unspecified: new and requires workup  Diagnosis management comments: Patients urine possibly represent UTI, but may be contaminated. Will start omnicef, rocephin given here in ER.     No acute " fracture or injury on CT head, C, T, and L Spine.     No acute abnormalities on cxr or pelvic x-ray.     Labs noncontributory.     DC home appearing well.            Amount and/or Complexity of Data Reviewed  Clinical lab tests: ordered and reviewed  Tests in the radiology section of CPT®: ordered and reviewed  Obtain history from someone other than the patient: yes  Review and summarize past medical records: yes  Independent visualization of images, tracings, or specimens: yes    Patient Progress  Patient progress: stable      Final diagnoses:   Fall, initial encounter   Urinary tract infection without hematuria, site unspecified   Nonintractable paroxysmal hemicrania, unspecified chronicity pattern       Documentation assistance provided by bronwyn Sung.  Information recorded by the scribe was done at my direction and has been verified and validated by me.       Juli Sung  04/04/18 1232       Itz Salazar MD  04/04/18 9511

## 2018-04-06 LAB
BACTERIA SPEC AEROBE CULT: NORMAL
BACTERIA SPEC AEROBE CULT: NORMAL

## 2018-06-23 ENCOUNTER — APPOINTMENT (OUTPATIENT)
Dept: CT IMAGING | Facility: HOSPITAL | Age: 83
End: 2018-06-23

## 2018-06-23 ENCOUNTER — HOSPITAL ENCOUNTER (EMERGENCY)
Facility: HOSPITAL | Age: 83
Discharge: HOME | End: 2018-06-23
Attending: EMERGENCY MEDICINE | Admitting: EMERGENCY MEDICINE

## 2018-06-23 VITALS
SYSTOLIC BLOOD PRESSURE: 161 MMHG | RESPIRATION RATE: 14 BRPM | TEMPERATURE: 98.7 F | HEIGHT: 63 IN | HEART RATE: 84 BPM | WEIGHT: 170 LBS | BODY MASS INDEX: 30.12 KG/M2 | OXYGEN SATURATION: 93 % | DIASTOLIC BLOOD PRESSURE: 92 MMHG
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
## 2018-06-23 DIAGNOSIS — F03.90 DEMENTIA WITHOUT BEHAVIORAL DISTURBANCE, UNSPECIFIED DEMENTIA TYPE: ICD-10-CM

## 2018-06-23 DIAGNOSIS — S09.90XA INJURY OF HEAD, INITIAL ENCOUNTER: Primary | ICD-10-CM

## 2018-06-23 LAB
ALBUMIN SERPL-MCNC: 4.3 G/DL (ref 3.5–5)
ALBUMIN/GLOB SERPL: 1.2 G/DL (ref 1–2)
ALP SERPL-CCNC: 83 U/L (ref 38–126)
ALT SERPL W P-5'-P-CCNC: 18 U/L (ref 13–69)
ANION GAP SERPL CALCULATED.3IONS-SCNC: 18.6 MMOL/L (ref 10–20)
AST SERPL-CCNC: 33 U/L (ref 15–46)
BASOPHILS # BLD AUTO: 0.04 10*3/MM3 (ref 0–0.2)
BASOPHILS NFR BLD AUTO: 0.6 % (ref 0–2.5)
BILIRUB SERPL-MCNC: 0.6 MG/DL (ref 0.2–1.3)
BILIRUB UR QL STRIP: NEGATIVE
BUN BLD-MCNC: 22 MG/DL (ref 7–20)
BUN/CREAT SERPL: 27.5 (ref 7.1–23.5)
CALCIUM SPEC-SCNC: 9.1 MG/DL (ref 8.4–10.2)
CHLORIDE SERPL-SCNC: 101 MMOL/L (ref 98–107)
CLARITY UR: ABNORMAL
CO2 SERPL-SCNC: 24 MMOL/L (ref 26–30)
COLOR UR: YELLOW
CREAT BLD-MCNC: 0.8 MG/DL (ref 0.6–1.3)
DEPRECATED RDW RBC AUTO: 45.8 FL (ref 37–54)
EOSINOPHIL # BLD AUTO: 0.14 10*3/MM3 (ref 0–0.7)
EOSINOPHIL NFR BLD AUTO: 2 % (ref 0–7)
ERYTHROCYTE [DISTWIDTH] IN BLOOD BY AUTOMATED COUNT: 13.5 % (ref 11.5–14.5)
GFR SERPL CREATININE-BSD FRML MDRD: 68 ML/MIN/1.73
GLOBULIN UR ELPH-MCNC: 3.5 GM/DL
GLUCOSE BLD-MCNC: 179 MG/DL (ref 74–98)
GLUCOSE UR STRIP-MCNC: NEGATIVE MG/DL
HCT VFR BLD AUTO: 39.5 % (ref 37–47)
HGB BLD-MCNC: 13.1 G/DL (ref 12–16)
HGB UR QL STRIP.AUTO: NEGATIVE
IMM GRANULOCYTES # BLD: 0.03 10*3/MM3 (ref 0–0.06)
IMM GRANULOCYTES NFR BLD: 0.4 % (ref 0–0.6)
KETONES UR QL STRIP: NEGATIVE
LEUKOCYTE ESTERASE UR QL STRIP.AUTO: NEGATIVE
LYMPHOCYTES # BLD AUTO: 2.24 10*3/MM3 (ref 0.6–3.4)
LYMPHOCYTES NFR BLD AUTO: 32.4 % (ref 10–50)
MCH RBC QN AUTO: 30.8 PG (ref 27–31)
MCHC RBC AUTO-ENTMCNC: 33.2 G/DL (ref 30–37)
MCV RBC AUTO: 92.7 FL (ref 81–99)
MONOCYTES # BLD AUTO: 0.73 10*3/MM3 (ref 0–0.9)
MONOCYTES NFR BLD AUTO: 10.6 % (ref 0–12)
NEUTROPHILS # BLD AUTO: 3.73 10*3/MM3 (ref 2–6.9)
NEUTROPHILS NFR BLD AUTO: 54 % (ref 37–80)
NITRITE UR QL STRIP: NEGATIVE
NRBC BLD MANUAL-RTO: 0 /100 WBC (ref 0–0)
PH UR STRIP.AUTO: 5.5 [PH] (ref 5–8)
PLATELET # BLD AUTO: 284 10*3/MM3 (ref 130–400)
PMV BLD AUTO: 9.2 FL (ref 6–12)
POTASSIUM BLD-SCNC: 4.6 MMOL/L (ref 3.5–5.1)
PROT SERPL-MCNC: 7.8 G/DL (ref 6.3–8.2)
PROT UR QL STRIP: NEGATIVE
RBC # BLD AUTO: 4.26 10*6/MM3 (ref 4.2–5.4)
SODIUM BLD-SCNC: 139 MMOL/L (ref 137–145)
SP GR UR STRIP: 1.01 (ref 1–1.03)
TROPONIN I SERPL-MCNC: <0.012 NG/ML (ref 0–0.03)
UROBILINOGEN UR QL STRIP: ABNORMAL
WBC NRBC COR # BLD: 6.91 10*3/MM3 (ref 4.8–10.8)

## 2018-06-23 PROCEDURE — 99284 EMERGENCY DEPT VISIT MOD MDM: CPT

## 2018-06-23 PROCEDURE — 80053 COMPREHEN METABOLIC PANEL: CPT | Performed by: PHYSICIAN ASSISTANT

## 2018-06-23 PROCEDURE — 81003 URINALYSIS AUTO W/O SCOPE: CPT | Performed by: PHYSICIAN ASSISTANT

## 2018-06-23 PROCEDURE — 70450 CT HEAD/BRAIN W/O DYE: CPT

## 2018-06-23 PROCEDURE — 72192 CT PELVIS W/O DYE: CPT

## 2018-06-23 PROCEDURE — 93005 ELECTROCARDIOGRAM TRACING: CPT | Performed by: EMERGENCY MEDICINE

## 2018-06-23 PROCEDURE — 85025 COMPLETE CBC W/AUTO DIFF WBC: CPT | Performed by: PHYSICIAN ASSISTANT

## 2018-06-23 PROCEDURE — 84484 ASSAY OF TROPONIN QUANT: CPT | Performed by: PHYSICIAN ASSISTANT

## 2018-06-23 PROCEDURE — 72125 CT NECK SPINE W/O DYE: CPT

## 2018-06-23 RX ORDER — SODIUM CHLORIDE 0.9 % (FLUSH) 0.9 %
10 SYRINGE (ML) INJECTION AS NEEDED
Status: DISCONTINUED | OUTPATIENT
Start: 2018-06-23 | End: 2018-06-23 | Stop reason: HOSPADM

## 2018-06-23 RX ORDER — ACETAMINOPHEN 325 MG/1
650 TABLET ORAL ONCE
Status: COMPLETED | OUTPATIENT
Start: 2018-06-23 | End: 2018-06-23

## 2018-06-23 RX ORDER — SODIUM CHLORIDE 9 MG/ML
125 INJECTION, SOLUTION INTRAVENOUS CONTINUOUS
Status: DISCONTINUED | OUTPATIENT
Start: 2018-06-23 | End: 2018-06-23

## 2018-06-23 RX ADMIN — ACETAMINOPHEN 650 MG: 325 TABLET, FILM COATED ORAL at 19:00

## 2018-06-30 ENCOUNTER — APPOINTMENT (OUTPATIENT)
Dept: CT IMAGING | Facility: HOSPITAL | Age: 83
End: 2018-06-30

## 2018-06-30 ENCOUNTER — HOSPITAL ENCOUNTER (EMERGENCY)
Facility: HOSPITAL | Age: 83
Discharge: HOME | End: 2018-06-30
Attending: EMERGENCY MEDICINE | Admitting: EMERGENCY MEDICINE

## 2018-06-30 VITALS
TEMPERATURE: 99 F | RESPIRATION RATE: 16 BRPM | BODY MASS INDEX: 30.73 KG/M2 | OXYGEN SATURATION: 91 % | HEIGHT: 64 IN | WEIGHT: 180 LBS | HEART RATE: 89 BPM | DIASTOLIC BLOOD PRESSURE: 88 MMHG | SYSTOLIC BLOOD PRESSURE: 150 MMHG

## 2018-06-30 DIAGNOSIS — R53.83 OTHER FATIGUE: ICD-10-CM

## 2018-06-30 DIAGNOSIS — R29.6 FREQUENT FALLS: Primary | ICD-10-CM

## 2018-06-30 LAB
ALBUMIN SERPL-MCNC: 4.1 G/DL (ref 3.5–5)
ALBUMIN/GLOB SERPL: 1.2 G/DL (ref 1–2)
ALP SERPL-CCNC: 94 U/L (ref 38–126)
ALT SERPL W P-5'-P-CCNC: 21 U/L (ref 13–69)
ANION GAP SERPL CALCULATED.3IONS-SCNC: 10.5 MMOL/L (ref 10–20)
AST SERPL-CCNC: 23 U/L (ref 15–46)
BACTERIA UR QL AUTO: ABNORMAL /HPF
BASOPHILS # BLD AUTO: 0.04 10*3/MM3 (ref 0–0.2)
BASOPHILS NFR BLD AUTO: 0.3 % (ref 0–2.5)
BILIRUB SERPL-MCNC: 0.8 MG/DL (ref 0.2–1.3)
BILIRUB UR QL STRIP: NEGATIVE
BUN BLD-MCNC: 15 MG/DL (ref 7–20)
BUN/CREAT SERPL: 21.4 (ref 7.1–23.5)
CALCIUM SPEC-SCNC: 9 MG/DL (ref 8.4–10.2)
CHLORIDE SERPL-SCNC: 102 MMOL/L (ref 98–107)
CLARITY UR: CLEAR
CO2 SERPL-SCNC: 28 MMOL/L (ref 26–30)
COLOR UR: YELLOW
CREAT BLD-MCNC: 0.7 MG/DL (ref 0.6–1.3)
D-LACTATE SERPL-SCNC: 1 MMOL/L (ref 0.5–2)
DEPRECATED RDW RBC AUTO: 46.1 FL (ref 37–54)
EOSINOPHIL # BLD AUTO: 0.06 10*3/MM3 (ref 0–0.7)
EOSINOPHIL NFR BLD AUTO: 0.4 % (ref 0–7)
ERYTHROCYTE [DISTWIDTH] IN BLOOD BY AUTOMATED COUNT: 13.5 % (ref 11.5–14.5)
GFR SERPL CREATININE-BSD FRML MDRD: 79 ML/MIN/1.73
GLOBULIN UR ELPH-MCNC: 3.3 GM/DL
GLUCOSE BLD-MCNC: 124 MG/DL (ref 74–98)
GLUCOSE UR STRIP-MCNC: NEGATIVE MG/DL
HCT VFR BLD AUTO: 35.3 % (ref 37–47)
HGB BLD-MCNC: 11.6 G/DL (ref 12–16)
HGB UR QL STRIP.AUTO: ABNORMAL
HOLD SPECIMEN: NORMAL
HOLD SPECIMEN: NORMAL
HYALINE CASTS UR QL AUTO: ABNORMAL /LPF
IMM GRANULOCYTES # BLD: 0.08 10*3/MM3 (ref 0–0.06)
IMM GRANULOCYTES NFR BLD: 0.6 % (ref 0–0.6)
KETONES UR QL STRIP: NEGATIVE
LEUKOCYTE ESTERASE UR QL STRIP.AUTO: NEGATIVE
LYMPHOCYTES # BLD AUTO: 1.71 10*3/MM3 (ref 0.6–3.4)
LYMPHOCYTES NFR BLD AUTO: 12.4 % (ref 10–50)
MCH RBC QN AUTO: 30.5 PG (ref 27–31)
MCHC RBC AUTO-ENTMCNC: 32.9 G/DL (ref 30–37)
MCV RBC AUTO: 92.9 FL (ref 81–99)
MONOCYTES # BLD AUTO: 1.32 10*3/MM3 (ref 0–0.9)
MONOCYTES NFR BLD AUTO: 9.6 % (ref 0–12)
MUCOUS THREADS URNS QL MICRO: ABNORMAL /HPF
NEUTROPHILS # BLD AUTO: 10.56 10*3/MM3 (ref 2–6.9)
NEUTROPHILS NFR BLD AUTO: 76.7 % (ref 37–80)
NITRITE UR QL STRIP: NEGATIVE
NRBC BLD MANUAL-RTO: 0 /100 WBC (ref 0–0)
PH UR STRIP.AUTO: 6 [PH] (ref 5–8)
PLATELET # BLD AUTO: 243 10*3/MM3 (ref 130–400)
PMV BLD AUTO: 9 FL (ref 6–12)
POTASSIUM BLD-SCNC: 4.5 MMOL/L (ref 3.5–5.1)
PROT SERPL-MCNC: 7.4 G/DL (ref 6.3–8.2)
PROT UR QL STRIP: NEGATIVE
RBC # BLD AUTO: 3.8 10*6/MM3 (ref 4.2–5.4)
RBC # UR: ABNORMAL /HPF
REF LAB TEST METHOD: ABNORMAL
SODIUM BLD-SCNC: 136 MMOL/L (ref 137–145)
SP GR UR STRIP: 1.02 (ref 1–1.03)
SQUAMOUS #/AREA URNS HPF: ABNORMAL /HPF
TROPONIN I SERPL-MCNC: <0.012 NG/ML (ref 0–0.03)
UROBILINOGEN UR QL STRIP: ABNORMAL
WBC NRBC COR # BLD: 13.77 10*3/MM3 (ref 4.8–10.8)
WBC UR QL AUTO: ABNORMAL /HPF
WHOLE BLOOD HOLD SPECIMEN: NORMAL
WHOLE BLOOD HOLD SPECIMEN: NORMAL

## 2018-06-30 PROCEDURE — 84484 ASSAY OF TROPONIN QUANT: CPT | Performed by: EMERGENCY MEDICINE

## 2018-06-30 PROCEDURE — 87040 BLOOD CULTURE FOR BACTERIA: CPT | Performed by: EMERGENCY MEDICINE

## 2018-06-30 PROCEDURE — 99284 EMERGENCY DEPT VISIT MOD MDM: CPT

## 2018-06-30 PROCEDURE — 71250 CT THORAX DX C-: CPT

## 2018-06-30 PROCEDURE — 70450 CT HEAD/BRAIN W/O DYE: CPT

## 2018-06-30 PROCEDURE — 85025 COMPLETE CBC W/AUTO DIFF WBC: CPT | Performed by: EMERGENCY MEDICINE

## 2018-06-30 PROCEDURE — 83605 ASSAY OF LACTIC ACID: CPT | Performed by: EMERGENCY MEDICINE

## 2018-06-30 PROCEDURE — 80053 COMPREHEN METABOLIC PANEL: CPT | Performed by: EMERGENCY MEDICINE

## 2018-06-30 PROCEDURE — 93005 ELECTROCARDIOGRAM TRACING: CPT | Performed by: EMERGENCY MEDICINE

## 2018-06-30 PROCEDURE — 81001 URINALYSIS AUTO W/SCOPE: CPT | Performed by: EMERGENCY MEDICINE

## 2018-06-30 RX ORDER — SODIUM CHLORIDE 0.9 % (FLUSH) 0.9 %
10 SYRINGE (ML) INJECTION AS NEEDED
Status: DISCONTINUED | OUTPATIENT
Start: 2018-06-30 | End: 2018-06-30 | Stop reason: HOSPADM

## 2018-07-02 ENCOUNTER — APPOINTMENT (OUTPATIENT)
Dept: GENERAL RADIOLOGY | Facility: HOSPITAL | Age: 83
End: 2018-07-02

## 2018-07-02 ENCOUNTER — APPOINTMENT (OUTPATIENT)
Dept: CT IMAGING | Facility: HOSPITAL | Age: 83
End: 2018-07-02

## 2018-07-02 ENCOUNTER — HOSPITAL ENCOUNTER (INPATIENT)
Facility: HOSPITAL | Age: 83
LOS: 4 days | Discharge: HOSPICE | End: 2018-07-06
Attending: EMERGENCY MEDICINE | Admitting: INTERNAL MEDICINE

## 2018-07-02 DIAGNOSIS — Z74.09 IMPAIRED MOBILITY AND ADLS: ICD-10-CM

## 2018-07-02 DIAGNOSIS — R53.81 DECLINING FUNCTIONAL STATUS: ICD-10-CM

## 2018-07-02 DIAGNOSIS — Z86.79 HISTORY OF ATRIAL FIBRILLATION: ICD-10-CM

## 2018-07-02 DIAGNOSIS — R09.02 HYPOXEMIA REQUIRING SUPPLEMENTAL OXYGEN: ICD-10-CM

## 2018-07-02 DIAGNOSIS — R53.1 GENERALIZED WEAKNESS: ICD-10-CM

## 2018-07-02 DIAGNOSIS — Z74.09 IMPAIRED FUNCTIONAL MOBILITY, BALANCE, GAIT, AND ENDURANCE: ICD-10-CM

## 2018-07-02 DIAGNOSIS — Z99.81 HYPOXEMIA REQUIRING SUPPLEMENTAL OXYGEN: ICD-10-CM

## 2018-07-02 DIAGNOSIS — I50.9 CHRONIC CONGESTIVE HEART FAILURE, UNSPECIFIED CONGESTIVE HEART FAILURE TYPE: Primary | ICD-10-CM

## 2018-07-02 DIAGNOSIS — Z78.9 IMPAIRED MOBILITY AND ADLS: ICD-10-CM

## 2018-07-02 PROBLEM — I48.20 ATRIAL FIBRILLATION, CHRONIC (HCC): Status: ACTIVE | Noted: 2018-07-02
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
## 2018-07-02 PROBLEM — E78.5 HYPERLIPIDEMIA: Status: ACTIVE | Noted: 2018-07-02

## 2018-07-02 PROBLEM — I48.91 ATRIAL FIBRILLATION (HCC): Status: ACTIVE | Noted: 2018-07-02

## 2018-07-02 PROBLEM — I10 ESSENTIAL HYPERTENSION: Status: ACTIVE | Noted: 2018-07-02

## 2018-07-02 PROBLEM — E03.9 HYPOTHYROID: Status: ACTIVE | Noted: 2018-07-02

## 2018-07-02 PROBLEM — J18.9 PNEUMONIA: Status: ACTIVE | Noted: 2018-07-02

## 2018-07-02 PROBLEM — F03.90 DEMENTIA (HCC): Status: ACTIVE | Noted: 2018-07-02

## 2018-07-02 LAB
ALBUMIN SERPL-MCNC: 4.11 G/DL (ref 3.2–4.8)
ALBUMIN/GLOB SERPL: 1.3 G/DL (ref 1.5–2.5)
ALP SERPL-CCNC: 91 U/L (ref 25–100)
ALT SERPL W P-5'-P-CCNC: 10 U/L (ref 7–40)
ANION GAP SERPL CALCULATED.3IONS-SCNC: 9 MMOL/L (ref 3–11)
AST SERPL-CCNC: 17 U/L (ref 0–33)
BACTERIA UR QL AUTO: ABNORMAL /HPF
BASOPHILS # BLD AUTO: 0.02 10*3/MM3 (ref 0–0.2)
BASOPHILS NFR BLD AUTO: 0.2 % (ref 0–1)
BILIRUB SERPL-MCNC: 1 MG/DL (ref 0.3–1.2)
BILIRUB UR QL STRIP: NEGATIVE
BNP SERPL-MCNC: 394 PG/ML (ref 0–100)
BUN BLD-MCNC: 11 MG/DL (ref 9–23)
BUN/CREAT SERPL: 14.9 (ref 7–25)
CALCIUM SPEC-SCNC: 9.6 MG/DL (ref 8.7–10.4)
CHLORIDE SERPL-SCNC: 103 MMOL/L (ref 99–109)
CLARITY UR: ABNORMAL
CO2 SERPL-SCNC: 28 MMOL/L (ref 20–31)
COLOR UR: YELLOW
CREAT BLD-MCNC: 0.74 MG/DL (ref 0.6–1.3)
DEPRECATED RDW RBC AUTO: 46 FL (ref 37–54)
EOSINOPHIL # BLD AUTO: 0.02 10*3/MM3 (ref 0–0.3)
EOSINOPHIL NFR BLD AUTO: 0.2 % (ref 0–3)
ERYTHROCYTE [DISTWIDTH] IN BLOOD BY AUTOMATED COUNT: 13.8 % (ref 11.3–14.5)
GFR SERPL CREATININE-BSD FRML MDRD: 74 ML/MIN/1.73
GLOBULIN UR ELPH-MCNC: 3.2 GM/DL
GLUCOSE BLD-MCNC: 127 MG/DL (ref 70–100)
GLUCOSE UR STRIP-MCNC: NEGATIVE MG/DL
HCT VFR BLD AUTO: 36.6 % (ref 34.5–44)
HGB BLD-MCNC: 11.9 G/DL (ref 11.5–15.5)
HGB UR QL STRIP.AUTO: ABNORMAL
HYALINE CASTS UR QL AUTO: ABNORMAL /LPF
IMM GRANULOCYTES # BLD: 0.06 10*3/MM3 (ref 0–0.03)
IMM GRANULOCYTES NFR BLD: 0.5 % (ref 0–0.6)
KETONES UR QL STRIP: ABNORMAL
LEUKOCYTE ESTERASE UR QL STRIP.AUTO: ABNORMAL
LIPASE SERPL-CCNC: 24 U/L (ref 6–51)
LYMPHOCYTES # BLD AUTO: 1.5 10*3/MM3 (ref 0.6–4.8)
LYMPHOCYTES NFR BLD AUTO: 11.3 % (ref 24–44)
MCH RBC QN AUTO: 29.7 PG (ref 27–31)
MCHC RBC AUTO-ENTMCNC: 32.5 G/DL (ref 32–36)
MCV RBC AUTO: 91.3 FL (ref 80–99)
MONOCYTES # BLD AUTO: 1.68 10*3/MM3 (ref 0–1)
MONOCYTES NFR BLD AUTO: 12.6 % (ref 0–12)
NEUTROPHILS # BLD AUTO: 10.07 10*3/MM3 (ref 1.5–8.3)
NEUTROPHILS NFR BLD AUTO: 75.7 % (ref 41–71)
NITRITE UR QL STRIP: NEGATIVE
PH UR STRIP.AUTO: 5.5 [PH] (ref 5–8)
PLATELET # BLD AUTO: 282 10*3/MM3 (ref 150–450)
PMV BLD AUTO: 9.9 FL (ref 6–12)
POTASSIUM BLD-SCNC: 3.6 MMOL/L (ref 3.5–5.5)
PROT SERPL-MCNC: 7.3 G/DL (ref 5.7–8.2)
PROT UR QL STRIP: ABNORMAL
RBC # BLD AUTO: 4.01 10*6/MM3 (ref 3.89–5.14)
RBC # UR: ABNORMAL /HPF
REF LAB TEST METHOD: ABNORMAL
SODIUM BLD-SCNC: 140 MMOL/L (ref 132–146)
SP GR UR STRIP: 1.02 (ref 1–1.03)
SQUAMOUS #/AREA URNS HPF: ABNORMAL /HPF
TROPONIN I SERPL-MCNC: 0 NG/ML (ref 0–0.07)
UROBILINOGEN UR QL STRIP: ABNORMAL
WBC NRBC COR # BLD: 13.29 10*3/MM3 (ref 3.5–10.8)
WBC UR QL AUTO: ABNORMAL /HPF

## 2018-07-02 PROCEDURE — 25010000002 CEFTRIAXONE PER 250 MG: Performed by: NURSE PRACTITIONER

## 2018-07-02 PROCEDURE — 83880 ASSAY OF NATRIURETIC PEPTIDE: CPT | Performed by: EMERGENCY MEDICINE

## 2018-07-02 PROCEDURE — 99285 EMERGENCY DEPT VISIT HI MDM: CPT

## 2018-07-02 PROCEDURE — 25010000002 FUROSEMIDE PER 20 MG: Performed by: HOSPITALIST

## 2018-07-02 PROCEDURE — 85025 COMPLETE CBC W/AUTO DIFF WBC: CPT | Performed by: EMERGENCY MEDICINE

## 2018-07-02 PROCEDURE — 99223 1ST HOSP IP/OBS HIGH 75: CPT | Performed by: HOSPITALIST

## 2018-07-02 PROCEDURE — 25010000002 AZITHROMYCIN: Performed by: NURSE PRACTITIONER

## 2018-07-02 PROCEDURE — 81001 URINALYSIS AUTO W/SCOPE: CPT | Performed by: EMERGENCY MEDICINE

## 2018-07-02 PROCEDURE — 87040 BLOOD CULTURE FOR BACTERIA: CPT | Performed by: NURSE PRACTITIONER

## 2018-07-02 PROCEDURE — 83690 ASSAY OF LIPASE: CPT | Performed by: EMERGENCY MEDICINE

## 2018-07-02 PROCEDURE — 71046 X-RAY EXAM CHEST 2 VIEWS: CPT

## 2018-07-02 PROCEDURE — 70450 CT HEAD/BRAIN W/O DYE: CPT

## 2018-07-02 PROCEDURE — 84484 ASSAY OF TROPONIN QUANT: CPT

## 2018-07-02 PROCEDURE — 80053 COMPREHEN METABOLIC PANEL: CPT | Performed by: EMERGENCY MEDICINE

## 2018-07-02 RX ORDER — POTASSIUM CHLORIDE 750 MG/1
20 CAPSULE, EXTENDED RELEASE ORAL DAILY
Status: DISCONTINUED | OUTPATIENT
Start: 2018-07-03 | End: 2018-07-06 | Stop reason: HOSPADM

## 2018-07-02 RX ORDER — LOSARTAN POTASSIUM 50 MG/1
50 TABLET ORAL DAILY
Status: DISCONTINUED | OUTPATIENT
Start: 2018-07-03 | End: 2018-07-06 | Stop reason: HOSPADM

## 2018-07-02 RX ORDER — FUROSEMIDE 40 MG/1
40 TABLET ORAL DAILY
COMMUNITY

## 2018-07-02 RX ORDER — FAMOTIDINE 20 MG/1
40 TABLET, FILM COATED ORAL 2 TIMES DAILY
Status: DISCONTINUED | OUTPATIENT
Start: 2018-07-02 | End: 2018-07-06 | Stop reason: HOSPADM

## 2018-07-02 RX ORDER — CARVEDILOL 6.25 MG/1
6.25 TABLET ORAL EVERY 12 HOURS SCHEDULED
Status: DISCONTINUED | OUTPATIENT
Start: 2018-07-02 | End: 2018-07-06 | Stop reason: HOSPADM

## 2018-07-02 RX ORDER — SODIUM CHLORIDE 0.9 % (FLUSH) 0.9 %
10 SYRINGE (ML) INJECTION AS NEEDED
Status: DISCONTINUED | OUTPATIENT
Start: 2018-07-02 | End: 2018-07-06 | Stop reason: HOSPADM

## 2018-07-02 RX ORDER — DILTIAZEM HYDROCHLORIDE 180 MG/1
180 CAPSULE, COATED, EXTENDED RELEASE ORAL
Status: DISCONTINUED | OUTPATIENT
Start: 2018-07-03 | End: 2018-07-06 | Stop reason: HOSPADM

## 2018-07-02 RX ORDER — DABIGATRAN ETEXILATE 150 MG/1
150 CAPSULE ORAL DAILY
Status: DISCONTINUED | OUTPATIENT
Start: 2018-07-03 | End: 2018-07-03

## 2018-07-02 RX ORDER — FUROSEMIDE 10 MG/ML
20 INJECTION INTRAMUSCULAR; INTRAVENOUS ONCE
Status: COMPLETED | OUTPATIENT
Start: 2018-07-02 | End: 2018-07-02

## 2018-07-02 RX ORDER — FUROSEMIDE 10 MG/ML
20 INJECTION INTRAMUSCULAR; INTRAVENOUS EVERY 12 HOURS
Status: DISCONTINUED | OUTPATIENT
Start: 2018-07-03 | End: 2018-07-06 | Stop reason: HOSPADM

## 2018-07-02 RX ORDER — SODIUM CHLORIDE 0.9 % (FLUSH) 0.9 %
1-10 SYRINGE (ML) INJECTION AS NEEDED
Status: DISCONTINUED | OUTPATIENT
Start: 2018-07-02 | End: 2018-07-06 | Stop reason: HOSPADM

## 2018-07-02 RX ORDER — ACETAMINOPHEN 325 MG/1
650 TABLET ORAL EVERY 6 HOURS PRN
Status: DISCONTINUED | OUTPATIENT
Start: 2018-07-02 | End: 2018-07-06 | Stop reason: HOSPADM

## 2018-07-02 RX ORDER — NYSTATIN 100000 [USP'U]/G
POWDER TOPICAL EVERY 12 HOURS SCHEDULED
Status: DISCONTINUED | OUTPATIENT
Start: 2018-07-02 | End: 2018-07-06 | Stop reason: HOSPADM

## 2018-07-02 RX ORDER — CEFTRIAXONE SODIUM 1 G/50ML
1 INJECTION, SOLUTION INTRAVENOUS
Status: DISCONTINUED | OUTPATIENT
Start: 2018-07-02 | End: 2018-07-05

## 2018-07-02 RX ORDER — IPRATROPIUM BROMIDE AND ALBUTEROL SULFATE 2.5; .5 MG/3ML; MG/3ML
3 SOLUTION RESPIRATORY (INHALATION) EVERY 4 HOURS PRN
Status: DISCONTINUED | OUTPATIENT
Start: 2018-07-02 | End: 2018-07-06 | Stop reason: HOSPADM

## 2018-07-02 RX ORDER — LEVOTHYROXINE SODIUM 0.05 MG/1
50 TABLET ORAL
Status: DISCONTINUED | OUTPATIENT
Start: 2018-07-03 | End: 2018-07-06 | Stop reason: HOSPADM

## 2018-07-02 RX ORDER — CITALOPRAM 20 MG/1
20 TABLET ORAL DAILY
Status: DISCONTINUED | OUTPATIENT
Start: 2018-07-03 | End: 2018-07-05

## 2018-07-02 RX ORDER — ALPRAZOLAM 0.25 MG/1
0.25 TABLET ORAL 2 TIMES DAILY PRN
Status: DISCONTINUED | OUTPATIENT
Start: 2018-07-02 | End: 2018-07-05

## 2018-07-02 RX ADMIN — NYSTATIN: 100000 POWDER TOPICAL at 23:30

## 2018-07-02 RX ADMIN — CEFTRIAXONE SODIUM 1 G: 1 INJECTION, SOLUTION INTRAVENOUS at 21:27

## 2018-07-02 RX ADMIN — CARVEDILOL 6.25 MG: 6.25 TABLET, FILM COATED ORAL at 21:27

## 2018-07-02 RX ADMIN — FUROSEMIDE 20 MG: 10 INJECTION, SOLUTION INTRAMUSCULAR; INTRAVENOUS at 21:27

## 2018-07-02 RX ADMIN — AZITHROMYCIN MONOHYDRATE 500 MG: 500 INJECTION, POWDER, LYOPHILIZED, FOR SOLUTION INTRAVENOUS at 21:58

## 2018-07-02 RX ADMIN — FAMOTIDINE 40 MG: 20 TABLET, FILM COATED ORAL at 21:27

## 2018-07-02 NOTE — PLAN OF CARE
Problem: Patient Care Overview  Goal: Plan of Care Review  Outcome: Ongoing (interventions implemented as appropriate)   07/02/18 190   Coping/Psychosocial   Plan of Care Reviewed With patient   Plan of Care Review   Progress improving   OTHER   Outcome Summary Pt arrived to the floor at this time.

## 2018-07-02 NOTE — H&P
"    [facility] Medicine Services  HISTORY AND PHYSICAL    Patient Name: Perla Ivan  : 1929  MRN: [id number]  Primary Care Physician: Kar Munoz DO    Subjective   Subjective     Chief Complaint:  SOA, cough, fever, weakness     HPI:  Perla Ivan is a 88 y.o. female with PMH significant for HTN, HLD, dementia, atrial fib, and hypothyroid disease that presents to the ED with complaint of SOA, fever, cough, and weakness. Her family at bedside helps to provide HPI as the pt is a poor historian. Her daughter who lives with her states that 4 days ago she \"slid\" off of a chair and since that time has become progressively weaker. She has been unable to get out of bed for the past 4 days and normally is up walking with a walker. She also notes decreased appetite for the past 3 days as well as cough and low grade fever with max temp of 100.1 on Saturday. The pt has had several visits to the ED over the past couple of weeks due to falls. The pt denies feeling worse than her baseline, but does note she has had difficulty taking a deep breath. She is oriented to who she is and where she is but is not sure why she is here.   Upon arrival to the ED, she is found to have leukocytosis as well as CXR concerning for exacerbation of her chronic CHF and possible pneumonia.   She will be admitted to [facility] for further evaluation.     Review of Systems   Unable to perform ROS: Dementia   Respiratory: Positive for cough and shortness of breath.       Please see HPI for family report as pt is poor historian     Otherwise 10-system ROS reviewed and is negative except as mentioned in the HPI.    Personal History     Past Medical History:   Diagnosis Date   • Atrial fibrillation (CMS/HCC)    • Atrial fibrillation (CMS/HCC)    • Dementia    • Disease of thyroid gland    • Hyperlipidemia    • Hypertension    • Vertigo        Past Surgical History:   Procedure Laterality Date   • BREAST SURGERY     • CYST " REMOVAL      L breat and L ovary       Family History: family history is not on file.     Social History:  reports that she has never smoked. She has never used smokeless tobacco. She reports that she does not drink alcohol or use drugs.  Social History     Social History Narrative   • No narrative on file       Medications:  Prescriptions Prior to Admission   Medication Sig Dispense Refill Last Dose   • ALPRAZolam (XANAX) 0.25 MG tablet Take 0.25 mg by mouth As Needed.   7/2/2018 at Unknown time   • carvedilol (COREG) 6.25 MG tablet Take 6.25 mg by mouth 2 (Two) Times a Day.   7/2/2018 at Unknown time   • citalopram (CeleXA) 20 MG tablet Take 20 mg by mouth Daily.   7/2/2018 at Unknown time   • dabigatran etexilate (PRADAXA) 150 MG capsu Take 150 mg by mouth Daily.   7/2/2018 at Unknown time   • diltiazem XR (DILACOR XR) 180 MG 24 hr capsule Take 180 mg by mouth Daily.   7/2/2018 at Unknown time   • fluocinonide (LIDEX) 0.05 % cream Apply 1 application topically 2 (Two) Times a Day.   7/2/2018 at Unknown time   • furosemide (LASIX) 40 MG tablet Take 40 mg by mouth Daily.   7/2/2018 at Unknown time   • levothyroxine (SYNTHROID, LEVOTHROID) 50 MCG tablet Take 50 mcg by mouth Daily.   6/30/2018   • losartan (COZAAR) 50 MG tablet Take 50 mg by mouth Daily.   7/2/2018 at Unknown time   • pilocarpine (PILOCAR) 1 % ophthalmic solution 1 drop 4 (Four) Times a Day. One eye but family can't remember   Past Month at Unknown time   • potassium chloride ER (K-TAB) 20 MEQ tablet controlled-release ER tablet Take 20 mEq by mouth Daily. Take with Furosemide   7/2/2018 at Unknown time   • pravastatin (PRAVACHOL) 40 MG tablet Take 20 mg by mouth Daily.   7/1/2018 at Unknown time   • meclizine (ANTIVERT) 25 MG tablet Take 1 tablet by mouth 4 (Four) Times a Day As Needed for dizziness. 20 tablet 0 More than a month at Unknown time   • raNITIdine (ZANTAC) 300 MG tablet Take 300 mg by mouth 2 (Two) Times a Day As Needed.   More than a  month at Unknown time       Allergies   Allergen Reactions   • No Known Drug Allergy        Objective   Objective     Vital Signs:   Temp:  [99.8 °F (37.7 °C)] 99.8 °F (37.7 °C)  Heart Rate:  [71-92] 87  Resp:  [22] 22  BP: (121-151)/(76-88) 121/88        Physical Exam   Constitutional: She appears well-developed and well-nourished. No distress.   HENT:   Head: Normocephalic.   Eyes: Pupils are equal, round, and reactive to light.   Neck: Normal range of motion. Neck supple. No JVD present.   Cardiovascular: Normal rate and intact distal pulses.  An irregularly irregular rhythm present. Exam reveals no gallop and no friction rub.    No murmur heard.  Pulmonary/Chest: Effort normal. No respiratory distress. She has rales.   Abdominal: Soft. Bowel sounds are normal. She exhibits no distension. There is tenderness. There is no guarding.   Musculoskeletal: Normal range of motion. She exhibits edema. She exhibits no tenderness.   +3 pitting edema BLE foot to just above ankle. Bilateral shins with vascular changes    Neurological: She is alert.   Oriented to self and knows she is in [place] at the hospital.    Skin: Skin is warm and dry. Capillary refill takes less than 2 seconds. No erythema. No pallor.   Psychiatric: She has a normal mood and affect. Her behavior is normal.   Vitals reviewed.       Results Reviewed:  I have personally reviewed current lab, radiology, and data and agree.      Results from last 7 days  Lab Units 07/02/18  1444   WBC 10*3/mm3 13.29*   HEMOGLOBIN g/dL 11.9   HEMATOCRIT % 36.6   PLATELETS 10*3/mm3 282       Results from last 7 days  Lab Units 07/02/18  1444 06/30/18  1605   SODIUM mmol/L 140 136*   POTASSIUM mmol/L 3.6 4.5   CHLORIDE mmol/L 103 102   CO2 mmol/L 28.0 28.0   BUN mg/dL 11 15   CREATININE mg/dL 0.74 0.70   GLUCOSE mg/dL 127* 124*   CALCIUM mg/dL 9.6 9.0   ALT (SGPT) U/L 10 21   AST (SGOT) U/L 17 23   TROPONIN I ng/mL  --  <0.012     Estimated Creatinine Clearance: 50.3 mL/min  (by C-G formula based on SCr of 0.74 mg/dL).  Brief Urine Lab Results  (Last result in the past 365 days)      Color   Clarity   Blood   Leuk Est   Nitrite   Protein   CREAT   Urine HCG        07/02/18 1444 Yellow Cloudy(A) Trace(A) Trace(A) Negative 30 mg/dL (1+)(A)             BNP   Date Value Ref Range Status   07/02/2018 394.0 (H) 0.0 - 100.0 pg/mL Final     Comment:     Results may be falsely decreased if patient taking Biotin.     Imaging Results (last 24 hours)     Procedure Component Value Units Date/Time    CT Head Without Contrast [[id number]] Collected:  07/02/18 1655     Updated:  07/02/18 1655    Narrative:       EXAMINATION: CT HEAD WO CONTRAST- 07/02/2018     INDICATION: Multiple falls on anticoagulation; headache     TECHNIQUE: Multiple axial CT imaging was obtained of the head from skull  base to skull vertex without the administration of intravenous contrast.     The radiation dose reduction device was turned on for each scan per the  ALARA (As Low as Reasonably Achievable) protocol.     COMPARISON: 04/04/2018     FINDINGS: Extensive vascular calcification identified again along the  right paramedian suprasellar cistern. This area is stable and unchanged  when compared to the prior study. There is some low-density area seen in  the periventricular and subcortical white matter suggesting chronic  small vessel ischemic change. Atrophy identified of the brain. No  hemorrhage or hydrocephalus. No mass, mass effect, or midline shift.  Bony structures reveal no evidence of osseous abnormality. The  visualized paranasal sinuses are clear. The mastoid air cells are  patent.       Impression:       Stable calcification seen again at the right paramedian  suprasellar cistern suggesting possible vascular calcification.  Extensive chronic changes seen within the brain with no acute  intracranial abnormality present.     D:  07/02/2018  E:  07/02/2018          XR Chest 2 View [[id number]] Collected:  07/02/18  1540     Updated:  07/02/18 1551    Narrative:       EXAMINATION: XR CHEST 2 VW- 07/02/2018     INDICATION: AMS and cough      COMPARISON: 04/04/2018     FINDINGS: Two-view chest reveals heart to be enlarged. Increased  pulmonary vascularity bilaterally with patchy ill-defined opacification  lung bases. Small bilateral pleural effusions identified. Increased  pulmonary vascularity bilaterally.           Impression:       Heart is enlarged with increased pulmonary vascularity  bilaterally and ill-defined opacification seen in the lung bases and  small bilateral pleural effusions.     D:  07/02/2018  E:  07/02/2018     This report was finalized on 7/2/2018 3:49 PM by Dr. Hattie Kruse MD.                Assessment/Plan   Assessment / Plan     Hospital Problem List     * (Principal)Acute on chronic congestive heart failure (CMS/HCC)    Weakness    Fall    Atrial fibrillation, chronic (CMS/HCC)    Dementia    Essential hypertension    Hyperlipidemia    Hypothyroid    Pneumonia             Assessment & Plan:  88 year old female presenting to the ED with complaint of progressive weakness over the past 4 days as well as decreased appetite, cough, fever, and SOA for the past 3 days who is found to have concerns for CHF exacerbation as well as possible early pneumonia.     CHF exacerbation   -Normally of 40mg lasix daily  -Will give 20mg IV now and then BID, transition back to PO as appropriate  -Heart failure navigator consult  -Daily wt  -ECHO in am, unclear when last one was  -Troponin negative in ED    Pneumonia  -symptomatic with leukocytosis, fever, cough, SOA  -will emperically give Rocephin and Azithromycin, deescalate as appropriate  -consider repeat CXR in am  -BC x 2  -Sputum cx  -CBC, BMP in am    Weakness with falls  -PT/OT consult  -CM for discharge planning     Atrial Fibrillation  -On pradaxa, may need to consider risk vs benefit given increase in falls and bleeding risk  -rate controlled  -continue  home medications     Hypertension  -continue home medications    Hyperlipidemia  -Hold statin for now given lower extremity weakness and falls    Hypothyroid  -continue home medication  -TSH in am      DVT prophylaxis:  -Continue pradaxa   -teds/scds    CODE STATUS:    Code Status and Medical Interventions:   Ordered at: 07/02/18 1932     Code Status:    CPR     Medical Interventions (Level of Support Prior to Arrest):    Full       Admission Status:  I believe this patient meets INPATIENT status due to the need for care which can only be reasonably provided in an hospital setting such as aggressive/expedited ancillary services and/or consultation services, the necessity for IV medications, close physician monitoring and/or the possible need for procedures.  In such, I feel patient’s risk for adverse outcomes and need for care warrant INPATIENT evaluation and predict the patient’s care encounter to likely last beyond 2 midnights.      Electronically signed by MERT Rojas, 07/02/18, 6:52 PM.    Brief Attending Admission Attestation     I have seen and examined the patient, performing an independent face-to-face diagnostic evaluation with plan of care reviewed and developed with the advanced practice clinician (APC).      Brief Summary Statement/HPI:   Perla Ivan is a 88 y.o. female with history of HTN, HLD, A Fib, Thyroid disease, Dementia who presented with fatigue, shortness of breath and cough.  She appears to be not walking for for several week and her family friend is with her but has limited history.  It appears she has been to [facility] several times recently and has imaging and work up consistent with chronic heart failure.  She has very shallow breathing and poor inspiratory capacity and looks week.  It sounds like she has not walked in 2 weeks.    Attending Physical Exam:    Constitutional: No acute distress, awake, fatigued appearance  HENT: NCAT, dry tongue  Respiratory: poor inspiratory  capacity, no wheezes, no rhonchi  Cardiovascular: RRR, s1 and s2  Gastrointestinal: Positive bowel sounds, soft, nontender, obese abdomen  Musculoskeletal: No bilateral ankle edema  Psychiatric: flat affect, weak  Neurologic: Oriented x 3, generalized weakness  Skin: dry, + skin tenting    Brief Assessment/Plan :  See above for further detailed assessment and plan developed with APC which I have reviewed and/or edited.      Electronically signed by Damian Alex MD, 07/02/18, 10:21 PM.

## 2018-07-02 NOTE — ED PROVIDER NOTES
"Subjective   Perlachandler Ivan is an 88 y.o. female with PMHx of dementia, A-fib, HTN, and HLD who presents to the ED with c/o generalized fatigue and cough. The patient states that upon waking this morning she felt alright but does note coughing \"on and off\". She expresses that she has been suffering from this cough for approximately 2-3 days but does not express genuine concern regarding this issue. The patient also states that she fell \"a few days ago\" but is extremely non-specific as to the mechanism of this incident.     The patient also complains of subjective BLE weakness but denies shortness of breath, abdominal pain, vomiting, focal weakness, dysuria, diarrhea, diaphoresis, night sweats, atypical BMs, headache, dizziness, visual disturbances, tinnitus, sneezing, sore throat, or any other acute complaints at this time.     Near the end of her examination Mrs. Ivan's family mentions that she reports to the ED today for further evaluation of her persistent fatigue and confusion. They state that the patient has been struggling to ambulate recently and falling, raising tremendous concern. Family also report persistent cough, lethargy, rhinorrhea, low grade fever (since resolved), and anorexia.         History provided by:  Patient  History limited by:  Dementia  Illness   Location:  Generalized  Quality:  Fatigue, cough  Severity:  Moderate  Timing:  Constant  Progression:  Worsening  Chronicity:  New  Context:  Pt reports to the ED with her family who report fatigue and diffculty ambulating. Also mention cough and rhinorrhea.   Relieved by:  Nothing  Worsened by:  Nothing  Ineffective treatments:  Rest  Associated symptoms: cough, fever (Low grade) and rhinorrhea    Associated symptoms: no abdominal pain, no diarrhea, no headaches, no shortness of breath, no sore throat and no vomiting        Review of Systems   Unable to perform ROS: Dementia   Constitutional: Positive for activity change, appetite change " (Anorexia) and fever (Low grade). Negative for diaphoresis.   HENT: Positive for rhinorrhea. Negative for sneezing, sore throat and tinnitus.    Eyes: Negative for visual disturbance.   Respiratory: Positive for cough. Negative for shortness of breath.    Gastrointestinal: Negative for abdominal pain, blood in stool, diarrhea and vomiting.   Genitourinary: Negative for dysuria.   Neurological: Positive for weakness (Pt's family report difficulty with ambulation). Negative for dizziness and headaches.       Past Medical History:   Diagnosis Date   • Atrial fibrillation (CMS/HCC)    • Atrial fibrillation (CMS/HCC)    • Dementia    • Disease of thyroid gland    • Hyperlipidemia    • Hypertension    • Vertigo        Allergies   Allergen Reactions   • No Known Drug Allergy        Past Surgical History:   Procedure Laterality Date   • BREAST SURGERY     • CYST REMOVAL      L breat and L ovary       History reviewed. No pertinent family history.    Social History     Social History   • Marital status:      Social History Main Topics   • Smoking status: Never Smoker   • Smokeless tobacco: Never Used   • Alcohol use No   • Drug use: No   • Sexual activity: Defer     Other Topics Concern   • Not on file         Objective   Physical Exam   Constitutional: She is oriented to person, place, and time. She appears well-developed and well-nourished. No distress.   HENT:   Head: Normocephalic and atraumatic.   Right Ear: External ear normal.   Left Ear: External ear normal.   Nose: Nose normal.   Eyes: Conjunctivae and EOM are normal. Pupils are equal, round, and reactive to light. No scleral icterus.   Neck: Normal range of motion. Neck supple.   Cardiovascular: Normal rate, regular rhythm, normal heart sounds and intact distal pulses.    No murmur heard.  Heart sounds normal upon auscultation.   Pulmonary/Chest: Effort normal and breath sounds normal. No respiratory distress. She has no rales.   Lung sounds normal with no  apparent crackles.   Abdominal: Soft. Bowel sounds are normal. There is no tenderness.   Musculoskeletal: Normal range of motion. She exhibits no edema or deformity.   Neurological: She is alert and oriented to person, place, and time.    strength 4+/5+ bilaterally.   Skin: Skin is warm and dry.   Psychiatric: She has a normal mood and affect. Her behavior is normal.   Patient's mentation is consistent with baseline according to family.   Nursing note and vitals reviewed.      Procedures         ED Course  ED Course as of Jul 02 2145   Mon Jul 02, 2018   1418 Charts reviewed from the patient's visits to [facility] over the last week and a half.  [RS]   1648 I had a lengthy discussion with the patient and family.  The family states the patient is unable to be cared for at home and were concerned about possible inpatient rehabilitation.  Patient does have evidence of chronic congestive heart failure with edema and functional decline.  We'll plan admission for the patient for further evaluation and management.  [RS]   1658 Case discussed with Dr. Alex who agrees with admit.  [RS]      ED Course User Index  [RS] Kaiser Arvizu MD         Recent Results (from the past 24 hour(s))   Comprehensive Metabolic Panel    Collection Time: 07/02/18  2:44 PM   Result Value Ref Range    Glucose 127 (H) 70 - 100 mg/dL    BUN 11 9 - 23 mg/dL    Creatinine 0.74 0.60 - 1.30 mg/dL    Sodium 140 132 - 146 mmol/L    Potassium 3.6 3.5 - 5.5 mmol/L    Chloride 103 99 - 109 mmol/L    CO2 28.0 20.0 - 31.0 mmol/L    Calcium 9.6 8.7 - 10.4 mg/dL    Total Protein 7.3 5.7 - 8.2 g/dL    Albumin 4.11 3.20 - 4.80 g/dL    ALT (SGPT) 10 7 - 40 U/L    AST (SGOT) 17 0 - 33 U/L    Alkaline Phosphatase 91 25 - 100 U/L    Total Bilirubin 1.0 0.3 - 1.2 mg/dL    eGFR Non African Amer 74 >60 mL/min/1.73    Globulin 3.2 gm/dL    A/G Ratio 1.3 (L) 1.5 - 2.5 g/dL    BUN/Creatinine Ratio 14.9 7.0 - 25.0    Anion Gap 9.0 3.0 - 11.0 mmol/L    Lipase    Collection Time: 07/02/18  2:44 PM   Result Value Ref Range    Lipase 24 6 - 51 U/L   Urinalysis With Microscopic If Indicated (No Culture) - Urine, Catheter    Collection Time: 07/02/18  2:44 PM   Result Value Ref Range    Color, UA Yellow Yellow, Straw    Appearance, UA Cloudy (A) Clear    pH, UA 5.5 5.0 - 8.0    Specific Gravity, UA 1.024 1.001 - 1.030    Glucose, UA Negative Negative    Ketones, UA Trace (A) Negative    Bilirubin, UA Negative Negative    Blood, UA Trace (A) Negative    Protein, UA 30 mg/dL (1+) (A) Negative    Leuk Esterase, UA Trace (A) Negative    Nitrite, UA Negative Negative    Urobilinogen, UA 1.0 E.U./dL 0.2 - 1.0 E.U./dL   BNP    Collection Time: 07/02/18  2:44 PM   Result Value Ref Range    .0 (H) 0.0 - 100.0 pg/mL   CBC Auto Differential    Collection Time: 07/02/18  2:44 PM   Result Value Ref Range    WBC 13.29 (H) 3.50 - 10.80 10*3/mm3    RBC 4.01 3.89 - 5.14 10*6/mm3    Hemoglobin 11.9 11.5 - 15.5 g/dL    Hematocrit 36.6 34.5 - 44.0 %    MCV 91.3 80.0 - 99.0 fL    MCH 29.7 27.0 - 31.0 pg    MCHC 32.5 32.0 - 36.0 g/dL    RDW 13.8 11.3 - 14.5 %    RDW-SD 46.0 37.0 - 54.0 fl    MPV 9.9 6.0 - 12.0 fL    Platelets 282 150 - 450 10*3/mm3    Neutrophil % 75.7 (H) 41.0 - 71.0 %    Lymphocyte % 11.3 (L) 24.0 - 44.0 %    Monocyte % 12.6 (H) 0.0 - 12.0 %    Eosinophil % 0.2 0.0 - 3.0 %    Basophil % 0.2 0.0 - 1.0 %    Immature Grans % 0.5 0.0 - 0.6 %    Neutrophils, Absolute 10.07 (H) 1.50 - 8.30 10*3/mm3    Lymphocytes, Absolute 1.50 0.60 - 4.80 10*3/mm3    Monocytes, Absolute 1.68 (H) 0.00 - 1.00 10*3/mm3    Eosinophils, Absolute 0.02 0.00 - 0.30 10*3/mm3    Basophils, Absolute 0.02 0.00 - 0.20 10*3/mm3    Immature Grans, Absolute 0.06 (H) 0.00 - 0.03 10*3/mm3   Urinalysis, Microscopic Only - Urine, Clean Catch    Collection Time: 07/02/18  2:44 PM   Result Value Ref Range    RBC, UA 3-6 (A) None Seen, 0-2 /HPF    WBC, UA 0-2 None Seen, 0-2 /HPF    Bacteria, UA None Seen  "None Seen, Trace /HPF    Squamous Epithelial Cells, UA 0-2 None Seen, 0-2 /HPF    Hyaline Casts, UA 0-6 0 - 6 /LPF    Methodology Automated Microscopy    POC Troponin, Rapid    Collection Time: 07/02/18  2:57 PM   Result Value Ref Range    Troponin I 0.00 0.00 - 0.07 ng/mL     Note: In addition to lab results from this visit, the labs listed above may include labs taken at another facility or during a different encounter within the last 24 hours. Please correlate lab times with ED admission and discharge times for further clarification of the services performed during this visit.    CT Head Without Contrast   Preliminary Result   Stable calcification seen again at the right paramedian   suprasellar cistern suggesting possible vascular calcification.   Extensive chronic changes seen within the brain with no acute   intracranial abnormality present.       D:  07/02/2018   E:  07/02/2018              XR Chest 2 View   Final Result   Heart is enlarged with increased pulmonary vascularity   bilaterally and ill-defined opacification seen in the lung bases and   small bilateral pleural effusions.       D:  07/02/2018   E:  07/02/2018       This report was finalized on 7/2/2018 3:49 PM by Dr. Hattie Kruse MD.            Vitals:    07/02/18 1700 07/02/18 1800 07/02/18 1859 07/02/18 2127   BP: 142/79 121/88 165/88    BP Location:   Left arm    Patient Position:   Lying    Pulse: 87  86 93   Resp:   20    Temp:   98.3 °F (36.8 °C)    TempSrc:   Oral    SpO2: 93%  91%    Weight:   88 kg (194 lb)    Height:   162.6 cm (64\")      Medications   sodium chloride 0.9 % flush 10 mL (not administered)   Pharmacy Meds to Bed Consult (not administered)   carvedilol (COREG) tablet 6.25 mg (6.25 mg Oral Given 7/2/18 2127)   citalopram (CeleXA) tablet 20 mg (not administered)   dabigatran etexilate (PRADAXA) capsule 150 mg (not administered)   diltiaZEM CD (CARDIZEM CD) 24 hr capsule 180 mg (not administered)   levothyroxine " (SYNTHROID, LEVOTHROID) tablet 50 mcg (not administered)   losartan (COZAAR) tablet 50 mg (not administered)   potassium chloride (MICRO-K) CR capsule 20 mEq (not administered)   famotidine (PEPCID) tablet 40 mg (40 mg Oral Given 7/2/18 2127)   sodium chloride 0.9 % flush 1-10 mL (not administered)   cefTRIAXone (ROCEPHIN) IVPB 1 g (1 g Intravenous New Bag 7/2/18 2127)     And   AZITHROMYCIN 500 MG/250 ML 0.9% NS IVPB (MBP) (not administered)   acetaminophen (TYLENOL) tablet 650 mg (not administered)   furosemide (LASIX) injection 20 mg (not administered)   ipratropium-albuterol (DUO-NEB) nebulizer solution 3 mL (not administered)   furosemide (LASIX) injection 20 mg (20 mg Intravenous Given 7/2/18 2127)     ECG/EMG Results (last 24 hours)     ** No results found for the last 24 hours. **                    MDM  Number of Diagnoses or Management Options  Chronic congestive heart failure, unspecified congestive heart failure type (CMS/HCC):   Declining functional status:   Generalized weakness:   History of atrial fibrillation:   Hypoxemia requiring supplemental oxygen:   Diagnosis management comments: ECG/EMG Results (last 24 hours)     ** No results found for the last 24 hours. **             Amount and/or Complexity of Data Reviewed  Clinical lab tests: reviewed  Tests in the radiology section of CPT®: reviewed  Decide to obtain previous medical records or to obtain history from someone other than the patient: yes  Obtain history from someone other than the patient: yes  Review and summarize past medical records: yes  Discuss the patient with other providers: yes  Independent visualization of images, tracings, or specimens: yes        Final diagnoses:   Chronic congestive heart failure, unspecified congestive heart failure type (CMS/HCC)   Declining functional status   Generalized weakness   Hypoxemia requiring supplemental oxygen   History of atrial fibrillation       Documentation assistance provided by bronwyn  Josemanuel Silverio.  Information recorded by the scribe was done at my direction and has been verified and validated by me.     Josemanuel Silverio  07/02/18 1415       Kaiser Arvizu MD  07/02/18 0699

## 2018-07-03 ENCOUNTER — APPOINTMENT (OUTPATIENT)
Dept: CARDIOLOGY | Facility: HOSPITAL | Age: 83
End: 2018-07-03

## 2018-07-03 LAB
ANION GAP SERPL CALCULATED.3IONS-SCNC: 10 MMOL/L (ref 3–11)
BASOPHILS # BLD AUTO: 0.02 10*3/MM3 (ref 0–0.2)
BASOPHILS NFR BLD AUTO: 0.1 % (ref 0–1)
BH CV ECHO MEAS - AO ROOT AREA (BSA CORRECTED): 1.6
BH CV ECHO MEAS - AO ROOT AREA: 7.1 CM^2
BH CV ECHO MEAS - AO ROOT DIAM: 3 CM
BH CV ECHO MEAS - BSA(HAYCOCK): 2 M^2
BH CV ECHO MEAS - BSA: 1.9 M^2
BH CV ECHO MEAS - BZI_BMI: 33.3 KILOGRAMS/M^2
BH CV ECHO MEAS - BZI_METRIC_HEIGHT: 162.6 CM
BH CV ECHO MEAS - BZI_METRIC_WEIGHT: 88 KG
BH CV ECHO MEAS - EDV(CUBED): 69.2 ML
BH CV ECHO MEAS - EDV(TEICH): 74.5 ML
BH CV ECHO MEAS - EF(CUBED): 87.5 %
BH CV ECHO MEAS - EF(TEICH): 81.7 %
BH CV ECHO MEAS - ESV(CUBED): 8.7 ML
BH CV ECHO MEAS - ESV(TEICH): 13.6 ML
BH CV ECHO MEAS - FS: 50 %
BH CV ECHO MEAS - IVS/LVPW: 0.9
BH CV ECHO MEAS - IVSD: 1.2 CM
BH CV ECHO MEAS - LA DIMENSION: 3.7 CM
BH CV ECHO MEAS - LA/AO: 1.2
BH CV ECHO MEAS - LV MASS(C)D: 188.4 GRAMS
BH CV ECHO MEAS - LV MASS(C)DI: 97.6 GRAMS/M^2
BH CV ECHO MEAS - LVIDD: 4.1 CM
BH CV ECHO MEAS - LVIDS: 2.1 CM
BH CV ECHO MEAS - LVPWD: 1.3 CM
BH CV ECHO MEAS - PA ACC SLOPE: 476.6 CM/SEC^2
BH CV ECHO MEAS - PA ACC TIME: 0.11 SEC
BH CV ECHO MEAS - PA PR(ACCEL): 31.5 MMHG
BH CV ECHO MEAS - RAP SYSTOLE: 8 MMHG
BH CV ECHO MEAS - RVSP: 39 MMHG
BH CV ECHO MEAS - SI(CUBED): 31.4 ML/M^2
BH CV ECHO MEAS - SI(TEICH): 31.5 ML/M^2
BH CV ECHO MEAS - SV(CUBED): 60.6 ML
BH CV ECHO MEAS - SV(TEICH): 60.9 ML
BH CV ECHO MEAS - TAPSE (>1.6): 0.9 CM2
BH CV ECHO MEAS - TR MAX V: 31 MMHG
BH CV ECHO MEAS - TR MAX VEL: 236.6 CM/SEC
BH CV VAS BP RIGHT ARM: NORMAL MMHG
BH CV XLRA - RV BASE: 3.6 CM
BH CV XLRA - RV LENGTH: 5.4 CM
BH CV XLRA - RV MID: 2.3 CM
BH CV XLRA - TDI S': 9.3 CM/SEC
BUN BLD-MCNC: 8 MG/DL (ref 9–23)
BUN/CREAT SERPL: 11.1 (ref 7–25)
CALCIUM SPEC-SCNC: 9 MG/DL (ref 8.7–10.4)
CHLORIDE SERPL-SCNC: 99 MMOL/L (ref 99–109)
CO2 SERPL-SCNC: 29 MMOL/L (ref 20–31)
CREAT BLD-MCNC: 0.72 MG/DL (ref 0.6–1.3)
DEPRECATED RDW RBC AUTO: 46.3 FL (ref 37–54)
EOSINOPHIL # BLD AUTO: 0.01 10*3/MM3 (ref 0–0.3)
EOSINOPHIL NFR BLD AUTO: 0.1 % (ref 0–3)
ERYTHROCYTE [DISTWIDTH] IN BLOOD BY AUTOMATED COUNT: 13.5 % (ref 11.3–14.5)
GFR SERPL CREATININE-BSD FRML MDRD: 76 ML/MIN/1.73
GLUCOSE BLD-MCNC: 135 MG/DL (ref 70–100)
HBA1C MFR BLD: 6.1 % (ref 4.8–5.6)
HCT VFR BLD AUTO: 38.8 % (ref 34.5–44)
HGB BLD-MCNC: 12.1 G/DL (ref 11.5–15.5)
IMM GRANULOCYTES # BLD: 0.06 10*3/MM3 (ref 0–0.03)
IMM GRANULOCYTES NFR BLD: 0.4 % (ref 0–0.6)
LV EF 2D ECHO EST: 75 %
LYMPHOCYTES # BLD AUTO: 1.14 10*3/MM3 (ref 0.6–4.8)
LYMPHOCYTES NFR BLD AUTO: 8.2 % (ref 24–44)
MCH RBC QN AUTO: 29 PG (ref 27–31)
MCHC RBC AUTO-ENTMCNC: 31.2 G/DL (ref 32–36)
MCV RBC AUTO: 93 FL (ref 80–99)
MONOCYTES # BLD AUTO: 1.42 10*3/MM3 (ref 0–1)
MONOCYTES NFR BLD AUTO: 10.3 % (ref 0–12)
NEUTROPHILS # BLD AUTO: 11.18 10*3/MM3 (ref 1.5–8.3)
NEUTROPHILS NFR BLD AUTO: 80.9 % (ref 41–71)
PLATELET # BLD AUTO: 259 10*3/MM3 (ref 150–450)
PMV BLD AUTO: 9.5 FL (ref 6–12)
POTASSIUM BLD-SCNC: 3.2 MMOL/L (ref 3.5–5.5)
POTASSIUM BLD-SCNC: 3.9 MMOL/L (ref 3.5–5.5)
RBC # BLD AUTO: 4.17 10*6/MM3 (ref 3.89–5.14)
SODIUM BLD-SCNC: 138 MMOL/L (ref 132–146)
TSH SERPL DL<=0.05 MIU/L-ACNC: 0.51 MIU/ML (ref 0.35–5.35)
WBC NRBC COR # BLD: 13.83 10*3/MM3 (ref 3.5–10.8)

## 2018-07-03 PROCEDURE — 25010000002 AZITHROMYCIN: Performed by: NURSE PRACTITIONER

## 2018-07-03 PROCEDURE — 93306 TTE W/DOPPLER COMPLETE: CPT

## 2018-07-03 PROCEDURE — 93306 TTE W/DOPPLER COMPLETE: CPT | Performed by: INTERNAL MEDICINE

## 2018-07-03 PROCEDURE — 94799 UNLISTED PULMONARY SVC/PX: CPT

## 2018-07-03 PROCEDURE — 94640 AIRWAY INHALATION TREATMENT: CPT

## 2018-07-03 PROCEDURE — 97110 THERAPEUTIC EXERCISES: CPT | Performed by: OCCUPATIONAL THERAPIST

## 2018-07-03 PROCEDURE — 25010000002 METHYLPREDNISOLONE PER 40 MG: Performed by: HOSPITALIST

## 2018-07-03 PROCEDURE — 25010000002 FUROSEMIDE PER 20 MG: Performed by: NURSE PRACTITIONER

## 2018-07-03 PROCEDURE — 85025 COMPLETE CBC W/AUTO DIFF WBC: CPT | Performed by: NURSE PRACTITIONER

## 2018-07-03 PROCEDURE — 99233 SBSQ HOSP IP/OBS HIGH 50: CPT | Performed by: HOSPITALIST

## 2018-07-03 PROCEDURE — 80048 BASIC METABOLIC PNL TOTAL CA: CPT | Performed by: NURSE PRACTITIONER

## 2018-07-03 PROCEDURE — 92610 EVALUATE SWALLOWING FUNCTION: CPT

## 2018-07-03 PROCEDURE — 84443 ASSAY THYROID STIM HORMONE: CPT | Performed by: NURSE PRACTITIONER

## 2018-07-03 PROCEDURE — 25010000002 CEFTRIAXONE PER 250 MG: Performed by: NURSE PRACTITIONER

## 2018-07-03 PROCEDURE — 83036 HEMOGLOBIN GLYCOSYLATED A1C: CPT | Performed by: NURSE PRACTITIONER

## 2018-07-03 PROCEDURE — 84132 ASSAY OF SERUM POTASSIUM: CPT | Performed by: HOSPITALIST

## 2018-07-03 PROCEDURE — 97163 PT EVAL HIGH COMPLEX 45 MIN: CPT

## 2018-07-03 PROCEDURE — 97166 OT EVAL MOD COMPLEX 45 MIN: CPT | Performed by: OCCUPATIONAL THERAPIST

## 2018-07-03 RX ORDER — POTASSIUM CHLORIDE 1.5 G/1.77G
40 POWDER, FOR SOLUTION ORAL AS NEEDED
Status: DISCONTINUED | OUTPATIENT
Start: 2018-07-03 | End: 2018-07-06 | Stop reason: HOSPADM

## 2018-07-03 RX ORDER — METHYLPREDNISOLONE SODIUM SUCCINATE 40 MG/ML
40 INJECTION, POWDER, LYOPHILIZED, FOR SOLUTION INTRAMUSCULAR; INTRAVENOUS ONCE
Status: COMPLETED | OUTPATIENT
Start: 2018-07-03 | End: 2018-07-03

## 2018-07-03 RX ORDER — POTASSIUM CHLORIDE 750 MG/1
40 CAPSULE, EXTENDED RELEASE ORAL AS NEEDED
Status: DISCONTINUED | OUTPATIENT
Start: 2018-07-03 | End: 2018-07-06 | Stop reason: HOSPADM

## 2018-07-03 RX ORDER — POTASSIUM CHLORIDE 7.45 MG/ML
10 INJECTION INTRAVENOUS
Status: DISCONTINUED | OUTPATIENT
Start: 2018-07-03 | End: 2018-07-06 | Stop reason: HOSPADM

## 2018-07-03 RX ORDER — DABIGATRAN ETEXILATE 150 MG/1
150 CAPSULE ORAL DAILY
Status: DISCONTINUED | OUTPATIENT
Start: 2018-07-03 | End: 2018-07-04

## 2018-07-03 RX ORDER — GUAIFENESIN 600 MG/1
600 TABLET, EXTENDED RELEASE ORAL 2 TIMES DAILY
Status: DISCONTINUED | OUTPATIENT
Start: 2018-07-03 | End: 2018-07-06 | Stop reason: HOSPADM

## 2018-07-03 RX ORDER — SACCHAROMYCES BOULARDII 250 MG
250 CAPSULE ORAL 2 TIMES DAILY
Status: DISCONTINUED | OUTPATIENT
Start: 2018-07-03 | End: 2018-07-06 | Stop reason: HOSPADM

## 2018-07-03 RX ADMIN — AZITHROMYCIN MONOHYDRATE 500 MG: 500 INJECTION, POWDER, LYOPHILIZED, FOR SOLUTION INTRAVENOUS at 08:17

## 2018-07-03 RX ADMIN — FUROSEMIDE 20 MG: 10 INJECTION, SOLUTION INTRAMUSCULAR; INTRAVENOUS at 05:14

## 2018-07-03 RX ADMIN — Medication 250 MG: at 22:22

## 2018-07-03 RX ADMIN — GUAIFENESIN 600 MG: 600 TABLET, EXTENDED RELEASE ORAL at 22:22

## 2018-07-03 RX ADMIN — DABIGATRAN ETEXILATE MESYLATE 150 MG: 150 CAPSULE ORAL at 17:18

## 2018-07-03 RX ADMIN — GUAIFENESIN 600 MG: 600 TABLET, EXTENDED RELEASE ORAL at 08:02

## 2018-07-03 RX ADMIN — FAMOTIDINE 40 MG: 20 TABLET, FILM COATED ORAL at 08:04

## 2018-07-03 RX ADMIN — CITALOPRAM HYDROBROMIDE 20 MG: 20 TABLET ORAL at 08:04

## 2018-07-03 RX ADMIN — IPRATROPIUM BROMIDE 0.5 MG: 0.5 SOLUTION RESPIRATORY (INHALATION) at 08:59

## 2018-07-03 RX ADMIN — POTASSIUM CHLORIDE 40 MEQ: 1.5 POWDER, FOR SOLUTION ORAL at 08:17

## 2018-07-03 RX ADMIN — IPRATROPIUM BROMIDE 0.5 MG: 0.5 SOLUTION RESPIRATORY (INHALATION) at 16:16

## 2018-07-03 RX ADMIN — LEVOTHYROXINE SODIUM 50 MCG: 50 TABLET ORAL at 08:02

## 2018-07-03 RX ADMIN — FUROSEMIDE 20 MG: 10 INJECTION, SOLUTION INTRAMUSCULAR; INTRAVENOUS at 17:18

## 2018-07-03 RX ADMIN — NYSTATIN: 100000 POWDER TOPICAL at 08:04

## 2018-07-03 RX ADMIN — IPRATROPIUM BROMIDE 0.5 MG: 0.5 SOLUTION RESPIRATORY (INHALATION) at 20:24

## 2018-07-03 RX ADMIN — DILTIAZEM HYDROCHLORIDE 180 MG: 180 CAPSULE, COATED, EXTENDED RELEASE ORAL at 08:04

## 2018-07-03 RX ADMIN — Medication 250 MG: at 08:02

## 2018-07-03 RX ADMIN — IPRATROPIUM BROMIDE 0.5 MG: 0.5 SOLUTION RESPIRATORY (INHALATION) at 12:43

## 2018-07-03 RX ADMIN — ALPRAZOLAM 0.25 MG: 0.25 TABLET ORAL at 19:00

## 2018-07-03 RX ADMIN — CEFTRIAXONE SODIUM 1 G: 1 INJECTION, SOLUTION INTRAVENOUS at 08:01

## 2018-07-03 RX ADMIN — LOSARTAN POTASSIUM 50 MG: 50 TABLET ORAL at 08:02

## 2018-07-03 RX ADMIN — CARVEDILOL 6.25 MG: 6.25 TABLET, FILM COATED ORAL at 22:21

## 2018-07-03 RX ADMIN — CARVEDILOL 6.25 MG: 6.25 TABLET, FILM COATED ORAL at 08:04

## 2018-07-03 RX ADMIN — FAMOTIDINE 40 MG: 20 TABLET, FILM COATED ORAL at 22:21

## 2018-07-03 RX ADMIN — METHYLPREDNISOLONE SODIUM SUCCINATE 40 MG: 40 INJECTION, POWDER, FOR SOLUTION INTRAMUSCULAR; INTRAVENOUS at 08:04

## 2018-07-03 RX ADMIN — ALPRAZOLAM 0.25 MG: 0.25 TABLET ORAL at 00:43

## 2018-07-03 NOTE — PLAN OF CARE
Problem: Patient Care Overview  Goal: Plan of Care Review  Outcome: Ongoing (interventions implemented as appropriate)   07/03/18 1611   Coping/Psychosocial   Plan of Care Reviewed With patient   OTHER   Outcome Summary Pt was ambulatory and assisting with ADLS 2 weeks ago per family report. She is alert, oriented to self and able to follow commands to participate. She demonstrates dyspnea with exertion, imapired strength and balance and needs assist with all ADLS and mobility. Family reports they are currently unable to care for her at her current status. Recommend SNF-level rehab.

## 2018-07-03 NOTE — PROGRESS NOTES
[facility] Medicine Services  PROGRESS NOTE    Patient Name: Perla Ivan  : 1929  MRN: [id number]    Date of Admission: 2018  Length of Stay: 1  Primary Care Physician: Kar Munoz DO    Subjective   Subjective     CC:  Weakness/dyspnea    HPI:  Slept overnight. Still very weak. Notes dyspnea. Denies chest pain. Difficulty mobilizing sputum. Denies n/v.    Review of Systems    Otherwise ROS is negative except as mentioned in the HPI.    Objective   Objective     Vital Signs:   Temp:  [98.2 °F (36.8 °C)-99.8 °F (37.7 °C)] 98.2 °F (36.8 °C)  Heart Rate:  [] 102  Resp:  [20-22] 20  BP: (121-165)/(76-97) 151/82        Physical Exam:  NAD, alert only   OP clear, MMM  PERRL  Neck supple  No LAD  Tachy  Coarse B, and tight, little air movement  Bibasilar rales  +BS, ND, NT  SANDHU  Tr LE edema  No rashes  Very flat    Results Reviewed:  I have personally reviewed current lab, radiology, and data and agree.      Results from last 7 days  Lab Units 18  0435 18  1444 18  1605   WBC 10*3/mm3 13.83* 13.29* 13.77*   HEMOGLOBIN g/dL 12.1 11.9 11.6*   HEMATOCRIT % 38.8 36.6 35.3*   PLATELETS 10*3/mm3 259 282 243       Results from last 7 days  Lab Units 18  0435 18  1444 18  1605   SODIUM mmol/L 138 140 136*   POTASSIUM mmol/L 3.2* 3.6 4.5   CHLORIDE mmol/L 99 103 102   CO2 mmol/L 29.0 28.0 28.0   BUN mg/dL 8* 11 15   CREATININE mg/dL 0.72 0.74 0.70   GLUCOSE mg/dL 135* 127* 124*   CALCIUM mg/dL 9.0 9.6 9.0   ALT (SGPT) U/L  --  10 21   AST (SGOT) U/L  --  17 23   TROPONIN I ng/mL  --   --  <0.012     Estimated Creatinine Clearance: 52.2 mL/min (by C-G formula based on SCr of 0.72 mg/dL).  BNP   Date Value Ref Range Status   2018 394.0 (H) 0.0 - 100.0 pg/mL Final     Comment:     Results may be falsely decreased if patient taking Biotin.       Microbiology Results Abnormal     None          Imaging Results (last 24 hours)     Procedure  Component Value Units Date/Time    CT Head Without Contrast [[id number]] Collected:  07/02/18 1655     Updated:  07/02/18 1655    Narrative:       EXAMINATION: CT HEAD WO CONTRAST- 07/02/2018     INDICATION: Multiple falls on anticoagulation; headache     TECHNIQUE: Multiple axial CT imaging was obtained of the head from skull  base to skull vertex without the administration of intravenous contrast.     The radiation dose reduction device was turned on for each scan per the  ALARA (As Low as Reasonably Achievable) protocol.     COMPARISON: 04/04/2018     FINDINGS: Extensive vascular calcification identified again along the  right paramedian suprasellar cistern. This area is stable and unchanged  when compared to the prior study. There is some low-density area seen in  the periventricular and subcortical white matter suggesting chronic  small vessel ischemic change. Atrophy identified of the brain. No  hemorrhage or hydrocephalus. No mass, mass effect, or midline shift.  Bony structures reveal no evidence of osseous abnormality. The  visualized paranasal sinuses are clear. The mastoid air cells are  patent.       Impression:       Stable calcification seen again at the right paramedian  suprasellar cistern suggesting possible vascular calcification.  Extensive chronic changes seen within the brain with no acute  intracranial abnormality present.     D:  07/02/2018  E:  07/02/2018          XR Chest 2 View [[id number]] Collected:  07/02/18 1540     Updated:  07/02/18 1551    Narrative:       EXAMINATION: XR CHEST 2 VW- 07/02/2018     INDICATION: AMS and cough      COMPARISON: 04/04/2018     FINDINGS: Two-view chest reveals heart to be enlarged. Increased  pulmonary vascularity bilaterally with patchy ill-defined opacification  lung bases. Small bilateral pleural effusions identified. Increased  pulmonary vascularity bilaterally.           Impression:       Heart is enlarged with increased pulmonary  vascularity  bilaterally and ill-defined opacification seen in the lung bases and  small bilateral pleural effusions.     D:  07/02/2018  E:  07/02/2018     This report was finalized on 7/2/2018 3:49 PM by Dr. Hattie Kruse MD.                I have reviewed the medications.    Assessment/Plan   Assessment / Plan     Hospital Problem List     * (Principal)Acute on chronic congestive heart failure (CMS/HCC)    Weakness    Fall    Atrial fibrillation, chronic (CMS/HCC)    Dementia    Essential hypertension    Hyperlipidemia    Hypothyroid    Pneumonia              Brief Hospital Course to date:  Perla Ivan is a 88 y.o. female here with dyspnea secondary to A/C CHF and pneumonia      Assessment & Plan:  CAP  --abx/nebs  --steroids x 1  --+/- P&PD  CHF  --ECHO pending  --diurese as tolerated  Weakness/falls  --PT  Afib  --on Pradaxa  HTN  HL  Hypothyroidism  Dementia  -  Consult palliative care regarding goals of care    DVT Prophylaxis:  On Pradaxa    CODE STATUS:   Code Status and Medical Interventions:   Ordered at: 07/02/18 1932     Code Status:    CPR     Medical Interventions (Level of Support Prior to Arrest):    Full       Disposition: I expect the patient to be discharged TBD.      Electronically signed by lAbert Collado MD, 07/03/18, 7:43 AM.

## 2018-07-03 NOTE — PROGRESS NOTES
Discharge Planning Assessment  [facility]     Patient Name: Perla Ivan  MRN: [id number]  Today's Date: 7/3/2018    Admit Date: 7/2/2018          Discharge Needs Assessment     Row Name 07/03/18 1104       Living Environment    Lives With child(adrienne), adult    Name(s) of Who Lives With Patient leighton Simms @ [phone]    Current Living Arrangements home/apartment/condo    Primary Care Provided by child(adrienne);other (see comments)   Patient has a hired caregiver.     Provides Primary Care For no one, unable/limited ability to care for self    Family Caregiver if Needed child(adrienne), adult;other (see comments)    Family Caregiver Names Daughter Demi and chris Potts and hired caregiver.     Quality of Family Relationships involved;supportive    Able to Return to Prior Arrangements yes    Living Arrangement Comments Patient lives with dtr Demi.        Resource/Environmental Concerns    Transportation Concerns car, none       Transition Planning    Patient/Family Anticipates Transition to inpatient rehabilitation facility    Transportation Anticipated family or friend will provide       Discharge Needs Assessment    Equipment Currently Used at Home cane, quad;rollator;walker, rolling    Anticipated Changes Related to Illness none    Equipment Needed After Discharge --   Ongoing assessment, family at this time wants patient to go for short term rehab, palliative consult made.     Outpatient/Agency/Support Group Needs inpatient rehabilitation facility    Discharge Facility/Level of Care Needs rehabilitation facility    Discharge Coordination/Progress Plan short term rehab in [facility].             Discharge Plan     Row Name 07/03/18 1109       Plan    Plan Rehab/Ongoing     Patient/Family in Agreement with Plan yes    Plan Comments Spoke with chris Potts at bedside. patient sleeping. Patient lives with dtr Demi Ivan @ [phone] in [place]. Patient has a hired caregiver 4 days per week from 630 to 300.  Children cover weekends. Recently, patient unable to use cane had to use walker for mobility. Family interested in rehab before home. Last week patient interested in [facility], unable to go there 2/2 no inpatient stay for Medicare to pay for service. Pt/Ot ordered, still needs to work with patient. Once therapy notes in chart then CM will contact [facility] and [place]. CM following. Sherrie @ [phone]     Row Name 07/03/18 1102       Plan    Plan Rehab/Ongoing         Destination     No service coordination in this encounter.      Durable Medical Equipment     No service coordination in this encounter.      Dialysis/Infusion     No service coordination in this encounter.      Home Medical Care     No service coordination in this encounter.      Social Care     No service coordination in this encounter.                Demographic Summary     Row Name 07/03/18 1102       General Information    Admission Type inpatient    Referral Source admission list    Reason for Consult discharge planning    Preferred Language English       Contact Information    Permission Granted to Share Info With     Contact Information Obtained for     Contact Information Comments PCP: Kar Munoz             Functional Status     Row Name 07/03/18 1103       Functional Status    Usual Activity Tolerance moderate    Current Activity Tolerance poor       Functional Status, IADL    Medications assistive person    Meal Preparation assistive person    Housekeeping assistive person    Laundry assistive person    Shopping assistive person    IADL Comments Patient has coverage for medications.        Mental Status Summary    Recent Changes in Mental Status/Cognitive Functioning unable to assess            Psychosocial    No documentation.           Abuse/Neglect    No documentation.           Legal    No documentation.           Substance Abuse    No documentation.           Patient Forms    No documentation.         Kim  CHAD Figueroa, RN

## 2018-07-03 NOTE — PLAN OF CARE
Problem: Patient Care Overview  Goal: Interprofessional Rounds/Family Conf  Outcome: Ongoing (interventions implemented as appropriate)   07/03/18 1300   Interdisciplinary Rounds/Family Conf   Summary Palliative consult from Dr. Albert Collado on 7/3/2018 at 0744 for GOC/ACP, MERT Germain will see and talk to patient and family. Son Delroy at bedside, I asked if someone makes decisions for her health care, He states Demi Mcpherson his sister, States she lives with her and she decides about her her care. She has four children Demi Mcpherson, Delroy, Vinod and Loyda. She has care givers at home, she is getting weaker and frequent falls, states his sister has breast cancer and having trouble caring for her. States they were looking at rehab after her antibiotics. Patient denies pain, anxiety or depression, states appetite is poor, feels sob, has a cough, weak, states she had a bm. has pneumonia, UTI, B/P elevator, MERT Germain reports she has not seen the patient and will go down after team. Palliative will continue to follow and support.   Participants ;nursing;advanced practice nurse;pastoral care;physician;social work/services       Problem: Palliative Care (Adult)  Goal: Identify Related Risk Factors and Signs and Symptoms  Outcome: Ongoing (interventions implemented as appropriate)   07/03/18 1300   Palliative Care (Adult)   Palliative Care: Related Risk Factors advanced age;condition is progressive;worsening symptoms   Palliative Care: Signs and Symptoms breathlessness;fatigue;loss of appetite

## 2018-07-03 NOTE — PLAN OF CARE
Problem: Patient Care Overview  Goal: Plan of Care Review  Outcome: Ongoing (interventions implemented as appropriate)   07/03/18 1300   Coping/Psychosocial   Plan of Care Reviewed With patient;son   Plan of Care Review   Progress improving   SLP re-evaluation completed. Will continue to address dysphagia concerns.  Pt exhibited no s/s of aspiration at the bedside w/ improved wakefulness and responsiveness to commands. REC: soft/chopped and thin 2' cog status and per son request. Aspiration precautions, oral care BID and PRN. Please see note for further details and recommendations.

## 2018-07-03 NOTE — PLAN OF CARE
Problem: Patient Care Overview  Goal: Plan of Care Review  Outcome: Ongoing (interventions implemented as appropriate)   07/03/18 0373   Coping/Psychosocial   Plan of Care Reviewed With patient;son   OTHER   Outcome Summary PT eval performed. Pt demonstrates generalized weakness in BLE's, balance deficts, and decreased safety awareness decreasing independence w/ functional mobility and transfers. Pt would benefit from skilled PT services w/ recommended d/c disposition to SNF.

## 2018-07-03 NOTE — THERAPY EVALUATION
Acute Care - Occupational Therapy Initial Evaluation  [facility]     Patient Name: Perla Ivan  : 1929  MRN: [id number]  Today's Date: 7/3/2018     Date of Referral to OT: 18  Referring Physician: [id number]    Admit Date: 2018       ICD-10-CM ICD-9-CM   1. Chronic congestive heart failure, unspecified congestive heart failure type (CMS/HCC) I50.9 428.0   2. Declining functional status R53.81 799.3   3. Generalized weakness R53.1 780.79   4. Hypoxemia requiring supplemental oxygen R09.02 799.02    Z99.81    5. History of atrial fibrillation Z86.79 V12.59   6. Impaired functional mobility, balance, gait, and endurance Z74.09 V49.89   7. Impaired mobility and ADLs Z74.09 799.89     Patient Active Problem List   Diagnosis   • Acute on chronic congestive heart failure (CMS/HCC)   • Weakness   • Fall   • Atrial fibrillation, chronic (CMS/HCC)   • Dementia   • Essential hypertension   • Hyperlipidemia   • Hypothyroid   • Pneumonia      Past Medical History:   Diagnosis Date   • Atrial fibrillation (CMS/HCC)    • Atrial fibrillation (CMS/HCC)    • Dementia    • Disease of thyroid gland    • Hyperlipidemia    • Hypertension    • Vertigo      Past Surgical History:   Procedure Laterality Date   • BREAST SURGERY     • CYST REMOVAL      L breat and L ovary          OT ASSESSMENT FLOWSHEET (last 72 hours)      Occupational Therapy Evaluation     Row Name 18 1616                   OT Evaluation Time/Intention    Subjective Information no complaints  -AR        Document Type evaluation  -AR        Mode of Treatment occupational therapy  -AR        Patient Effort good  -AR        Symptoms Noted During/After Treatment fatigue  -AR           General Information    Patient Profile Reviewed? yes  -AR        Referring Physician [id number]  -AR        Patient Observations alert;cooperative;agree to therapy  -AR        Patient/Family Observations Son at bedside and reports pt is restless and awake, has been sleeping  most of day earlier   -AR        Prior Level of Function independent:;all household mobility;community mobility;gait;transfer;min assist:;ADL's   about 2 weeks ago, past several days max assist ADLs/mobilit  -AR        Equipment Currently Used at Home cane, straight;walker, rolling  -AR        Pertinent History of Current Functional Problem Pt is an 88 yof who presented wtih fatigue, dyspnea, inability to ambulate. Pt found to have AOC CHF and PNA.   -AR        Existing Precautions/Restrictions fall;oxygen therapy device and L/min;other (see comments)   baseline dementia  -AR        Risks Reviewed patient:;LOB;nausea/vomiting;dizziness;increased discomfort;change in vital signs;lines disloged  -AR        Benefits Reviewed patient:;improve function;increase independence;increase strength;increase balance;decrease pain;decrease risk of DVT;increase knowledge  -AR        Barriers to Rehab cognitive status;previous functional deficit  -AR           Relationship/Environment    Primary Source of Support/Comfort child(adrienne)  -AR        Lives With child(adrienne), adult  -AR        Expected Impact of Illness/Hospitalization Pt lives with her daughter. She has private caregivers daily while daughter is at work.  -AR           Resource/Environmental Concerns    Current Living Arrangements home/apartment/condo  -AR        Resource/Environmental Concerns none  -AR           Cognitive Assessment/Interventions    Additional Documentation Cognitive Assessment/Intervention (Group)  -AR           Cognitive Assessment/Intervention- PT/OT    Affect/Mental Status (Cognitive) confused  -AR        Orientation Status (Cognition) oriented to;person  -AR        Follows Commands (Cognition) follows one step commands;50-74% accuracy  -AR        Cognitive Function (Cognitive) safety deficit;memory deficit  -AR        Memory Deficit (Cognitive) moderate deficit  -AR        Safety Deficit (Cognitive) moderate deficit  -AR        Personal Safety  Interventions fall prevention program maintained  -AR           Safety Issues, Functional Mobility    Safety Issues Affecting Function (Mobility) ability to follow commands;judgment;problem solving;safety precaution awareness  -AR        Impairments Affecting Function (Mobility) balance;cognition;strength;pain;shortness of breath  -AR           Bed Mobility Assessment/Treatment    Bed Mobility Assessment/Treatment supine-sit;sit-supine;scooting/bridging;rolling left;rolling right  -AR        Rolling Left [place] (Bed Mobility) maximum assist (25% patient effort);verbal cues  -AR        Rolling Right [place] (Bed Mobility) maximum assist (25% patient effort);verbal cues  -AR        Scooting/Bridging [place] (Bed Mobility) maximum assist (25% patient effort)  -AR        Supine-Sit [place] (Bed Mobility) maximum assist (25% patient effort);2 person assist;verbal cues  -AR        Sit-Supine [place] (Bed Mobility) maximum assist (25% patient effort);2 person assist;verbal cues  -AR        Bed Mobility, Safety Issues cognitive deficits limit understanding;decreased use of arms for pushing/pulling;decreased use of legs for bridging/pushing  -AR        Assistive Device (Bed Mobility) bed rails;draw sheet;head of bed elevated  -AR        Comment (Bed Mobility) Dyspnea noted with exertion  -AR           Functional Mobility    Functional Mobility- Ind. Level unable to perform  -AR           Transfer Assessment/Treatment    Transfer Assessment/Treatment sit-stand transfer;stand-sit transfer  -AR        Comment (Transfers) Pt unable to achieve upright strance  -AR           Sit-Stand Transfer    Sit-Stand [place] (Transfers) maximum assist (25% patient effort);2 person assist  -AR        Assistive Device (Sit-Stand Transfers) other (see comments)   elevated bed surface, BUE support  -AR           Stand-Sit Transfer    Stand-Sit [place] (Transfers) maximum assist (25% patient effort);2 person  assist   elevated bed surface, BUE support  -AR           ADL Assessment/Intervention    BADL Assessment/Intervention upper body dressing;lower body dressing  -AR           Upper Body Dressing Assessment/Training    Upper Body Dressing [place] Level don;doff;pajama/robe;maximum assist (25% patient effort)  -AR        Upper Body Dressing Position edge of bed sitting  -AR           Lower Body Dressing Assessment/Training    Lower Body Dressing [place] Level don;socks;dependent (less than 25% patient effort)  -AR        Lower Body Dressing Position edge of bed sitting  -AR           BADL Safety/Performance    Impairments, BADL Safety/Performance balance;cognition;pain;range of motion;strength;shortness of breath  -AR           General ROM    GENERAL ROM COMMENTS BUE AAROM WFL  -AR           General Assessment (Manual Muscle Testing)    Comment, General Manual Muscle Testing (MMT) Assessment unable to accurately assess d/t cognition  -AR           Motor Assessment/Interventions    Additional Documentation Balance (Group);Balance Interventions (Group);Therapeutic Exercise (Group);Therapeutic Exercise Interventions (Group)  -AR           Therapeutic Exercise    29061 - OT Therapeutic Exercise Minutes 9  -AR           Therapeutic Exercise    Upper Extremity Range of Motion (Therapeutic Exercise) shoulder flexion/extension, bilateral;shoulder abduction/adduction, bilateral;elbow flexion/extension, bilateral;forearm supination/pronation, bilateral;wrist flexion/extension, bilateral  -AR        Exercise Type (Therapeutic Exercise) AAROM (active assistive range of motion);AROM (active range of motion)  -AR        Position (Therapeutic Exercise) supine  -AR        Sets/Reps (Therapeutic Exercise) 1/12  -AR           Balance    Balance static sitting balance  -AR           Static Sitting Balance    Level of [place] (Unsupported Sitting, Static Balance) minimal assist, 75% patient effort  -AR        Sitting  Position (Unsupported Sitting, Static Balance) sitting on edge of bed  -AR        Time Able to Maintain Position (Unsupported Sitting, Static Balance) 3 to 4 minutes  -AR           Sensory Assessment/Intervention    Sensory General Assessment other (see comments)   unable to accurately assess  -AR           Positioning and Restraints    Pre-Treatment Position in bed  -AR        Post Treatment Position bed  -AR        In Bed notified nsg;supine;call light within reach;encouraged to call for assist;exit alarm on;with family/caregiver   heel elevated on foam wedge  -AR           Pain Scale: FACES Pre/Post-Treatment    Pain: FACES Scale, Pretreatment 0-->no hurt  -AR        Pain: FACES Scale, Post-Treatment 0-->no hurt  -AR           Plan of Care Review    Plan of Care Reviewed With patient  -AR           Clinical Impression (OT)    Date of Referral to OT 07/02/18  -AR        OT Diagnosis S. Garcia  -AR        Prognosis (OT Eval) decreased independence with ADLS  -AR        Patient/Family Goals Statement (OT Eval) family desires rehab placement d/t inabilty to care for her  -AR        Criteria for Skilled Therapeutic Interventions Met (OT Eval) yes;treatment indicated  -AR        Rehab Potential (OT Eval) good, to achieve stated therapy goals  -AR        Therapy Frequency (OT Eval) daily  -AR        Care Plan Review (OT) evaluation/treatment results reviewed;care plan/treatment goals reviewed;risks/benefits reviewed;current/potential barriers reviewed;patient/other agree to care plan  -AR        Care Plan Review, Other Participant (OT Eval) son  -AR        Anticipated Discharge Disposition (OT) skilled nursing facility  -AR           Vital Signs    Pretreatment Heart Rate (beats/min) 79  -AR        Posttreatment Heart Rate (beats/min) 85  -AR        Pre SpO2 (%) 92  -AR        O2 Delivery Pre Treatment supplemental O2   4L NC  -AR        Intra SpO2 (%) 85  -AR        O2 Delivery Intra Treatment room air  -AR        Post  SpO2 (%) 91  -AR        O2 Delivery Post Treatment supplemental O2  -AR        Pre Patient Position Supine  -AR        Intra Patient Position Sitting  -AR        Post Patient Position Supine  -AR           Planned OT Interventions    Planned Therapy Interventions (OT Eval) activity tolerance training;BADL retraining;functional balance retraining;ROM/therapeutic exercise;strengthening exercise;transfer/mobility retraining  -AR           OT Goals    Transfer Goal Selection (OT) transfer, OT goal 1  -AR        Dressing Goal Selection (OT) dressing, OT goal 1  -AR        Toileting Goal Selection (OT) --  -AR        ROM Goal Selection (OT) ROM, OT goal 1  -AR        Additional Documentation ROM Goal Selection (OT) (Row)  -AR           Transfer Goal 1 (OT)    Activity/Assistive Device (Transfer Goal 1, OT) sit-to-stand/stand-to-sit;toilet;commode, bedside without drop arms;walker, rolling  -AR        Bath Level/Cues Needed (Transfer Goal 1, OT) tactile cues required;verbal cues required;maximum assist (25-49% patient effort)  -AR        Time Frame (Transfer Goal 1, OT) short term goal (STG);5 days  -AR        Progress/Outcome (Transfer Goal 1, OT) goal ongoing  -AR           Dressing Goal 1 (OT)    Activity/Assistive Device (Dressing Goal 1, OT) upper body dressing  -AR        Bath/Cues Needed (Dressing Goal 1, OT) moderate assist (50-74% patient effort);tactile cues required  -AR        Time Frame (Dressing Goal 1, OT) short term goal (STG);5 days  -AR        Progress/Outcome (Dressing Goal 1, OT) goal ongoing  -AR           Toileting Goal 1 (OT)    Activity/Device (Toileting Goal 1, OT) toileting skills, all;commode, bedside without drop arms  -AR        [place]ath Level/Cues Needed (Toileting Goal 1, OT) tactile cues required;verbal cues required;maximum assist (25-49% patient effort)  -AR        Time Frame (Toileting Goal 1, OT) short term goal (STG);5 days  -AR        Progress/Outcome (Toileting  Goal 1, OT) goal ongoing  -AR           ROM Goal 1 (OT)    ROM Goal 1 (OT) Pt will complete BUE AROM 1 set x 10 reps to support ADL function  -AR        Time Frame (ROM Goal 1, OT) short term goal (STG);1 week  -AR        Progress/Outcome (ROM Goal 1, OT) goal ongoing  -AR          User Key  (r) = Recorded By, (t) = Taken By, (c) = Cosigned By    Initials Name Effective Dates    AR Karley Silva, OT 06/22/15 -                  OT Recommendation and Plan  Outcome Summary/Treatment Plan (OT)  Anticipated Discharge Disposition (OT): skilled nursing facility  Planned Therapy Interventions (OT Eval): activity tolerance training, BADL retraining, functional balance retraining, ROM/therapeutic exercise, strengthening exercise, transfer/mobility retraining  Therapy Frequency (OT Eval): daily  Plan of Care Review  Plan of Care Reviewed With: patient  Plan of Care Reviewed With: patient  Outcome Summary: Pt was amulatory and assisting with ADLS 2 weeks ago per family report. She is alert, oriented to self and able to follow commands to participate. She demonstrates dyspnea with exertion, impaired strength and balance and needs assist with all ADLS and mobility. Family reports they are currently unable to care for her at her current status. Recommend SNF-level rehab.           Outcome Measures     Row Name 07/03/18 1616 07/03/18 0955          How much help from another person do you currently need...    Turning from your back to your side while in flat bed without using bedrails?  -- 2  -KOLE (r) CM (t) KOLE (c)     Moving from lying on back to sitting on the side of a flat bed without bedrails?  -- 2  -KOLE (r) CM (t) KOLE (c)     Moving to and from a bed to a chair (including a wheelchair)?  -- 2  -KOLE (r) CM (t) KOLE (c)     Standing up from a chair using your arms (e.g., wheelchair, bedside chair)?  -- 2  -KOLE (r) CM (t) KOLE (c)     Climbing 3-5 steps with a railing?  -- 1  -KOLE (r) CM (t) KOLE (c)     To walk in hospital room?  -- 2   -KOLE (r) CM (t) KOLE (c)     AM-PAC 6 Clicks Score  -- 11  -KOLE (r) CM (t)        How much help from another is currently needed...    Putting on and taking off regular lower body clothing? 1  -AR  --     Bathing (including washing, rinsing, and drying) 1  -AR  --     Toileting (which includes using toilet bed pan or urinal) 1  -AR  --     Putting on and taking off regular upper body clothing 2  -AR  --     Taking care of personal grooming (such as brushing teeth) 2  -AR  --     Eating meals 2  -AR  --     Score 9  -AR  --        Functional Assessment    Outcome Measure Options AM-PAC 6 Clicks Daily Activity (OT)  -AR AM-PAC 6 Clicks Basic Mobility (PT)  -KOLE (r) CM (t) KOLE (c)       User Key  (r) = Recorded By, (t) = Taken By, (c) = Cosigned By    Initials Name Provider Type    KOLE Hurley, PT Physical Therapist    AR Karley Silva, OT Occupational Therapist    ANA Kendrick, PT Student PT Student          Time Calculation:      Therapy Suggested Charges     Code   Minutes Charges    82503 (CPT®) Hc Ot Neuromusc Re Education Ea 15 Min      90491 (CPT®) Hc Ot Ther Proc Ea 15 Min 9 1    25868 (CPT®) Hc Gait Training Ea 15 Min      12047 (CPT®) Hc Ot Therapeutic Act Ea 15 Min      57748 (CPT®) Hc Ot Manual Therapy Ea 15 Min      27662 (CPT®) Hc Ot Iontophoresis Ea 15 Min      15089 (CPT®) Hc Ot Elec Stim Ea-Per 15 Min      06087 (CPT®) Hc Ot Ultrasound Ea 15 Min      58799 (CPT®) Hc Ot Self Care/Mgmt/Train Ea 15 Min      Total  9 1        Therapy Charges for Today     Code Description Service Date Service Provider Modifiers Qty    66140470402 HC OT THER PROC EA 15 MIN 7/3/2018 Karley Silva OT GO 1    70844986654 HC OT EVAL MOD COMPLEXITY 4 7/3/2018 Karley Silva OT GO 1    14293708354 HC OT THER SUPP EA 15 MIN 7/3/2018 Karley Silva OT GO 3               Karley Silva OT  7/3/2018

## 2018-07-03 NOTE — PLAN OF CARE
Problem: Patient Care Overview  Goal: Plan of Care Review  Outcome: Ongoing (interventions implemented as appropriate)   07/03/18 0056   Coping/Psychosocial   Plan of Care Reviewed With patient   Plan of Care Review   Progress no change   OTHER   Outcome Summary Patient admitted before shift change, patient reeducated on calling out. external catheter applied secondary to frequency and urgency and patient being a fall risk. patient remains in afib on monitor. patient currently on 4L

## 2018-07-03 NOTE — PLAN OF CARE
Problem: Patient Care Overview  Goal: Plan of Care Review  Outcome: Ongoing (interventions implemented as appropriate)   07/03/18 0973   Coping/Psychosocial   Plan of Care Reviewed With patient;son   Plan of Care Review   Progress (eval)     SLP evaluation completed. Will address dysphagia concerns. Pt difficult to assess at BS this am 2' cog status. Will re-evaluate by BS this pm. REC: diet can remain as is until re-eval, meds crushed w/ puree/pudding.  Please see note for further details and recommendations.

## 2018-07-03 NOTE — CONSULTS
"Kar Munoz DO  Consulting physician: Albert Collado  Reason for referral: goc discussion  Chief Complaint   Patient presents with   • Fatigue   • Cough     HPI:   88 year old female with history of multiple falls, dementia, HTN, Afib, CHF presenting to the ED with complaint of progressive weakness over the past 4 days as well as decreased appetite, cough, fever, and SOA for the past 3 days who is found to have concerns for CHF exacerbation as well as possible early pneumonia.   Symptoms:   Patient has limited ability to answer yes/no questions, unable to follow commands.   + dyspnea and hypoxia with exertion.   More tired today per son's report.   Noted patient has had increased falls at home and family having a harder time caring for her with immobility and diminished functional status  Advance care planning discussed: yes  Code Status: Reviewed with sonDelroy, no change in status.   Current Code Status     Date Active Code Status Order ID Comments User Context       7/2/2018  7:32 PM CPR [id number]  [facility]jas Inpatient       Questions for Current Code Status     Question Answer Comment    Code Status CPR     Medical Interventions (Level of Support Prior to Arrest) Full         Advance Directive: no written document  Surrogate decision maker: NOK: Four children: Demi \"Vida\", Delroy, Vinod and Loyda.  Patient lives with Vida and children have deferred to her for healthcare decisions per Delroy's report.   Past Medical History:   Diagnosis Date   • Atrial fibrillation (CMS/HCC)    • Atrial fibrillation (CMS/HCC)    • Dementia    • Disease of thyroid gland    • Hyperlipidemia    • Hypertension    • Vertigo      Past Surgical History:   Procedure Laterality Date   • BREAST SURGERY     • CYST REMOVAL      L breat and L ovary       Reviewed current scheduled and prn medications for route, type, dose and frequency.    Current Facility-Administered Medications   Medication Dose Route Frequency Provider Last " Rate Last Dose   • acetaminophen (TYLENOL) tablet 650 mg  650 mg Oral Q6H PRN Paige Garcia APRN       • ALPRAZolam (XANAX) tablet 0.25 mg  0.25 mg Oral BID PRN Nicol Keaton, APRN   0.25 mg at 07/03/18 0043   • cefTRIAXone (ROCEPHIN) IVPB 1 g  1 g Intravenous Q24H Paige Garcia APRN 100 mL/hr at 07/03/18 0801 1 g at 07/03/18 0801    And   • AZITHROMYCIN 500 MG/250 ML 0.9% NS IVPB (MBP)  500 mg Intravenous Q24H Paige Garcia APRN   500 mg at 07/03/18 0817   • carvedilol (COREG) tablet 6.25 mg  6.25 mg Oral Q12H Paige Garcia APRN   6.25 mg at 07/03/18 0804   • citalopram (CeleXA) tablet 20 mg  20 mg Oral Daily Paige Garcia APRN   20 mg at 07/03/18 0804   • dabigatran etexilate (PRADAXA) capsule 150 mg  150 mg Oral Daily Paige Garcia APRN       • diltiaZEM CD (CARDIZEM CD) 24 hr capsule 180 mg  180 mg Oral Q24H Paige Garcia APRN   180 mg at 07/03/18 0804   • famotidine (PEPCID) tablet 40 mg  40 mg Oral BID Paige Garcia APRN   40 mg at 07/03/18 0804   • furosemide (LASIX) injection 20 mg  20 mg Intravenous Q12H Paige Garcia APRN   20 mg at 07/03/18 0514   • guaiFENesin (MUCINEX) 12 hr tablet 600 mg  600 mg Oral BID Albert Collado MD   600 mg at 07/03/18 0802   • ipratropium (ATROVENT) nebulizer solution 0.5 mg  0.5 mg Nebulization 4x Daily - RT Albert Collado MD   0.5 mg at 07/03/18 0859   • ipratropium-albuterol (DUO-NEB) nebulizer solution 3 mL  3 mL Nebulization Q4H PRN Paige Garcia APRN       • levothyroxine (SYNTHROID, LEVOTHROID) tablet 50 mcg  50 mcg Oral Q AM Paige Garcia APRN   50 mcg at 07/03/18 0802   • losartan (COZAAR) tablet 50 mg  50 mg Oral Daily MERT Rojas   50 mg at 07/03/18 0802   • nystatin (MYCOSTATIN) powder   Topical Q12H Damian Alex MD       • Pharmacy Meds to Bed Consult   Does not apply Daily MERT Rojas   Stopped at 07/02/18 2222   • potassium chloride (MICRO-K) CR capsule 40 mEq  40 mEq Oral PRN Albert Collado MD        Or  "  • potassium chloride (KLOR-CON) packet 40 mEq  40 mEq Oral PRN Albert Collado MD   40 mEq at 07/03/18 0817    Or   • potassium chloride 10 mEq in 100 mL IVPB  10 mEq Intravenous Q1H PRN Albert Collado MD       • potassium chloride (MICRO-K) CR capsule 20 mEq  20 mEq Oral Daily MERT Rojas   Stopped at 07/03/18 0805   • saccharomyces boulardii (FLORASTOR) capsule 250 mg  250 mg Oral BID Albert Collado MD   250 mg at 07/03/18 0802   • sodium chloride 0.9 % flush 1-10 mL  1-10 mL Intravenous PRN MERT Rojas       • sodium chloride 0.9 % flush 10 mL  10 mL Intravenous PRN Kaiser Arvizu MD            •  acetaminophen  •  ALPRAZolam  •  ipratropium-albuterol  •  potassium chloride **OR** potassium chloride **OR** potassium chloride  •  sodium chloride  •  Insert peripheral IV **AND** sodium chloride  Allergies   Allergen Reactions   • No Known Drug Allergy      History reviewed. No pertinent family history.  Social History     Social History   • Marital status:      Spouse name: N/A   • Number of children: N/A   • Years of education: N/A     Occupational History   • Not on file.     Social History Main Topics   • Smoking status: Never Smoker   • Smokeless tobacco: Never Used   • Alcohol use No   • Drug use: No   • Sexual activity: Defer     Other Topics Concern   • Not on file     Social History Narrative   • No narrative on file     Review of Systems - +/- per HPI and symptom review.    PPS: 30%  /78 (BP Location: Left arm, Patient Position: Lying)   Pulse 113   Temp 97.9 °F (36.6 °C) (Oral)   Resp 18   Ht 162.6 cm (64\")   Wt 88 kg (194 lb)   SpO2 92%   BMI 33.30 kg/m²   88 kg (194 lb) Body mass index is 33.3 kg/m².  Intake & Output (last day)       07/02 0701 - 07/03 0700 07/03 0701 - 07/04 0700    P.O. 120     IV Piggyback 300     Total Intake(mL/kg) 420 (4.8)     Urine (mL/kg/hr) 1050 500 (1.3)    Total Output 1050 500    Net -630 -500          Unmeasured Urine " Occurrence 2 x           Physical Exam:  General Appearance: No acute distress, ill appearing  Head: Normocephalic without obvious abnormality, atraumatic  Eyes: Conjunctivae and sclerae normal, no icterus, SHAN  Throat: No oral lesions, no thrush, oral mucosa moist  Lungs: Clear to auscultation, respirations regular, even and non-labored, snores easily  Heart: Regular rhythm and normal rate, normal S1  Abdomen: Normal bowel sounds, soft, non-distended, non-tender  Extremities: no redness, no cyanosis, no edema  Pulses: Distal pulses palpable and equal bilaterally  Skin: Warm and dry  Neurological: Awake, difficulty with following commands, no myoclonus    Reviewed labs and diagnostic results.  Lab Results   Component Value Date    HGBA1C 6.10 (H) 07/03/2018       Results from last 7 days  Lab Units 07/03/18  0435   WBC 10*3/mm3 13.83*   HEMOGLOBIN g/dL 12.1   HEMATOCRIT % 38.8   PLATELETS 10*3/mm3 259       Results from last 7 days  Lab Units 07/03/18  0435 07/02/18  1444   SODIUM mmol/L 138 140   POTASSIUM mmol/L 3.2* 3.6   CHLORIDE mmol/L 99 103   CO2 mmol/L 29.0 28.0   BUN mg/dL 8* 11   CREATININE mg/dL 0.72 0.74   CALCIUM mg/dL 9.0 9.6   BILIRUBIN mg/dL  --  1.0   ALK PHOS U/L  --  91   ALT (SGPT) U/L  --  10   AST (SGOT) U/L  --  17   GLUCOSE mg/dL 135* 127*       Results from last 7 days  Lab Units 07/03/18  0435   SODIUM mmol/L 138   POTASSIUM mmol/L 3.2*   CHLORIDE mmol/L 99   CO2 mmol/L 29.0   BUN mg/dL 8*   CREATININE mg/dL 0.72   GLUCOSE mg/dL 135*   CALCIUM mg/dL 9.0     Imaging Results (last 72 hours)     Procedure Component Value Units Date/Time    CT Head Without Contrast [[id number]] Collected:  07/02/18 1655     Updated:  07/03/18 0827    Narrative:       EXAMINATION: CT HEAD WO CONTRAST- 07/02/2018     INDICATION: Multiple falls on anticoagulation; headache     TECHNIQUE: Multiple axial CT imaging was obtained of the head from skull  base to skull vertex without the administration of intravenous  contrast.     The radiation dose reduction device was turned on for each scan per the  ALARA (As Low as Reasonably Achievable) protocol.     COMPARISON: 04/04/2018     FINDINGS: Extensive vascular calcification identified again along the  right paramedian suprasellar cistern. This area is stable and unchanged  when compared to the prior study. There is some low-density area seen in  the periventricular and subcortical white matter suggesting chronic  small vessel ischemic change. Atrophy identified of the brain. No  hemorrhage or hydrocephalus. No mass, mass effect, or midline shift.  Bony structures reveal no evidence of osseous abnormality. The  visualized paranasal sinuses are clear. The mastoid air cells are  patent.       Impression:       Stable calcification seen again at the right paramedian  suprasellar cistern suggesting possible vascular calcification.  Extensive chronic changes seen within the brain with no acute  intracranial abnormality present.     D:  07/02/2018  E:  07/02/2018        This report was finalized on 7/3/2018 8:24 AM by Dr. Hattie Kruse MD.       XR Chest 2 View [[id number]] Collected:  07/02/18 1540     Updated:  07/02/18 1551    Narrative:       EXAMINATION: XR CHEST 2 VW- 07/02/2018     INDICATION: AMS and cough      COMPARISON: 04/04/2018     FINDINGS: Two-view chest reveals heart to be enlarged. Increased  pulmonary vascularity bilaterally with patchy ill-defined opacification  lung bases. Small bilateral pleural effusions identified. Increased  pulmonary vascularity bilaterally.           Impression:       Heart is enlarged with increased pulmonary vascularity  bilaterally and ill-defined opacification seen in the lung bases and  small bilateral pleural effusions.     D:  07/02/2018  E:  07/02/2018     This report was finalized on 7/2/2018 3:49 PM by Dr. Hattie Kruse MD.         ECHO:  7/3/18  Interpretation Summary     · Left ventricular wall thickness is consistent  with mild concentric hypertrophy.  · Mild mitral valve regurgitation is present.  · Mild pulmonic valve regurgitation is present.  · Mild to moderate tricuspid valve regurgitation is present.  · Calculated right ventricular systolic pressure from tricuspid regurgitation is 39 mmHg.  · Left ventricular systolic function is normal. Estimated EF = 75%.  · There is no evidence of pericardial effusion.  · Mild pulmonary hypertension is present.  · MAC is present.  · The aortic valve exhibits sclerosis.  · Normal right ventricular cavity size, wall thickness, systolic function and septal motion noted.       Impression: 88 y.o. female with advanced dementia, acute respiratory failure with hypoxia, pneumonia, Afib  Plan:   Dyspnea - continue to monitor    Lethargy - therapy evaluations in progress but limited with depressed cognitive status    Goals of care discussion -  Met with son, Delroy, at bedside.   Discussed current status and expected outcomes with dementia, diminished functional status and repeated falls. Discussion about family making choices about plans of care based on patient's wishes. Reviewed code status and expected outcomes.   Current disposition plan is to for LTC facility placement, with diminished functional ability family is having more difficulty caring for the patient at home.   Delroy stated his sister, Vida, would be returning to hospital later in the afternoon.   Informed that if they would make a decision to change code status that they could inform the patient's nurse.     Reviewed and updated with nursing.     Palliative care will continue to follow and assist with ongoing clinical decisions.     Eliana Conte, MERT  [phone]  07/03/18  11:19 AM      Time: 60 minutes spent reviewing medical and medication records, assessing and examining patient, discussing with patient, family and nursing staff, answering questions, formulating a plan and documentation of care. > 50% time spent face to face

## 2018-07-03 NOTE — THERAPY EVALUATION
Acute Care - Speech Language Pathology   Swallow Initial Evaluation [facility]   Clinical Swallow Evaluation       Patient Name: Perla Ivan  : 1929  MRN: [id number]  Today's Date: 7/3/2018               Admit Date: 2018    Visit Dx:     ICD-10-CM ICD-9-CM   1. Chronic congestive heart failure, unspecified congestive heart failure type (CMS/HCC) I50.9 428.0   2. Declining functional status R53.81 799.3   3. Generalized weakness R53.1 780.79   4. Hypoxemia requiring supplemental oxygen R09.02 799.02    Z99.81    5. History of atrial fibrillation Z86.79 V12.59     Patient Active Problem List   Diagnosis   • Acute on chronic congestive heart failure (CMS/HCC)   • Weakness   • Fall   • Atrial fibrillation, chronic (CMS/HCC)   • Dementia   • Essential hypertension   • Hyperlipidemia   • Hypothyroid   • Pneumonia      Past Medical History:   Diagnosis Date   • Atrial fibrillation (CMS/HCC)    • Atrial fibrillation (CMS/HCC)    • Dementia    • Disease of thyroid gland    • Hyperlipidemia    • Hypertension    • Vertigo      Past Surgical History:   Procedure Laterality Date   • BREAST SURGERY     • CYST REMOVAL      L breat and L ovary          SWALLOW EVALUATION (last 72 hours)      SLP Adult Swallow Evaluation     Row Name 18 0945                   Rehab Evaluation    Document Type (P)  evaluation  -LW        Subjective Information (P)  complains of;pain  -LW        Patient Observations (P)  poorly cooperative;lethargic  -LW        Patient/Family Observations (P)  pt largely unintelligible 2' cog, dec'd breath support. Son present, not able to receive much hx from him, he said to wait for his sister  -LW        Patient Effort (P)  fair  -LW        Comment (P)  pt lethargic, reduced cog status  -LW           General Information    Patient Profile Reviewed (P)  yes  -LW        Pertinent History Of Current Problem (P)  Adm chronic congestive heart failure, possible Pna?, Hx dementia   -LW         Current Method of Nutrition (P)  regular textures;thin liquids  -LW        Precautions/Limitations, Vision (P)  WFL;for purposes of eval  -LW        Precautions/Limitations, Hearing (P)  WFL  -LW        Prior Level of Function-Communication (P)  cognitive-linguistic impairment   baseline dementia  -LW        Prior Level of Function-Swallowing (P)  no diet consistency restrictions  -LW        Plans/Goals Discussed with (P)  patient;family  -LW        Barriers to Rehab (P)  medically complex;cognitive status  -LW        Patient's Goals for Discharge (P)  patient did not state  -LW           Pain Assessment    Additional Documentation (P)  Pain Scale: FACES Pre/Post-Treatment (Group)  -LW           Pain Scale: FACES Pre/Post-Treatment    Pain: FACES Scale, Pretreatment (P)  6-->hurts even more  -LW        Pain: FACES Scale, Post-Treatment (P)  6-->hurts even more  -LW        Pre/Post Treatment Pain Comment (P)  feet and legs  -LW           Oral Motor and Function    Dentition Assessment (P)  upper dentures/partial in place  -LW        Secretion Management (P)  WNL/WFL  -LW        Mucosal Quality (P)  moist, healthy  -LW        Volitional Swallow (P)  weak  -LW        Volitional Cough (P)  weak;reduced respiratory support  -LW           Oral Musculature and Cranial Nerve Assessment    Oral Motor General Assessment (P)  unable to assess  -LW           General Eating/Swallowing Observations    Respiratory Support Currently in Use (P)  nasal cannula  -LW        Eating/Swallowing Skills (P)  fed by SLP  -LW        Positioning During Eating (P)  upright in bed  -LW        Utensils Used (P)  spoon;cup  -LW        Consistencies Trialed (P)  pureed;thin liquids;nectar/syrup-thick liquids  -LW           Clinical Swallow Eval    Oral Prep Phase (P)  impaired  -LW        Pharyngeal Phase (P)  suspected pharyngeal impairment   hard to assess 2' cog status  -LW        Clinical Swallow Evaluation Summary (P)  Pt seen for BS this AM.  "Cog status made BS difficult to assess. Pt administerd thins (tsp and cup), nectar, and puree. Cough w/ tsp thins only. Multiple Swallows w/ cup thins only. No s/s w/ any other consistency. Pt made \"snorting sound\" throughout eval. Possible velopharyngeal incompetency. Will re-assess this pm in hope that cog status may improve. REC: diet as is, meds crushed w/ puree/pudding, aspiration precautions.  -LW           Oral Prep Concerns    Oral Prep Concerns (P)  anterior loss;incomplete or weak lip closure around spoon  -LW        Incomplete or Weak Lip Closure Around Spoon (P)  thin  -LW        Anterior Loss (P)  thin  -LW           Pharyngeal Phase Concerns    Pharyngeal Phase Concerns (P)  cough;multiple swallows  -LW        Multiple Swallows (P)  thin   cup  -LW        Cough (P)  thin   tsp  -LW           Clinical Impression    SLP Swallowing Diagnosis (P)  mild;oral dysfunction;suspected pharyngeal dysfunction  -LW        Functional Impact (P)  risk of aspiration/pneumonia  -LW        Rehab Potential/Prognosis, Swallowing (P)  good, to achieve stated therapy goals  -LW        Criteria for Skilled Therapeutic Interventions Met (P)  demonstrates skilled criteria  -LW           Recommendations    Therapy Frequency (Swallow) (P)  evaluation only  -LW        Predicted Duration Therapy Intervention (Days) (P)  until discharge  -LW        SLP Diet Recommendation (P)  regular textures;thin liquids  -LW        Recommended Diagnostics (P)  reassess via clinical swallow evaluation  -LW        Recommended Precautions and Strategies (P)  upright posture during/after eating;small bites of food and sips of liquid  -LW        SLP Rec. for Method of Medication Administration (P)  meds crushed;with pudding or applesauce  -LW        Monitor for Signs of Aspiration (P)  yes;cough;gurgly voice;throat clearing;pneumonia;notify SLP if any concerns  -LW        Anticipated Dischage Disposition (P)  unknown  -LW          User Key  (r) = " Recorded By, (t) = Taken By, (c) = Cosigned By    Initials Name Effective Dates    LW Katey Dixon, Speech Therapy Student 05/14/18 -         EDUCATION  The patient has been educated in the following areas:   Dysphagia (Swallowing Impairment) Modified Diet Instruction.    SLP Recommendation and Plan  SLP Swallowing Diagnosis: (P) mild, oral dysfunction, suspected pharyngeal dysfunction  SLP Diet Recommendation: (P) regular textures, thin liquids  Recommended Precautions and Strategies: (P) upright posture during/after eating, small bites of food and sips of liquid     Monitor for Signs of Aspiration: (P) yes, cough, gurgly voice, throat clearing, pneumonia, notify SLP if any concerns  Recommended Diagnostics: (P) reassess via clinical swallow evaluation  Criteria for Skilled Therapeutic Interventions Met: (P) demonstrates skilled criteria  Anticipated Dischage Disposition: (P) unknown  Rehab Potential/Prognosis, Swallowing: (P) good, to achieve stated therapy goals  Therapy Frequency (Swallow): (P) evaluation only  Predicted Duration Therapy Intervention (Days): (P) until discharge       Plan of Care Reviewed With: (P) patient, son  Plan of Care Review  Plan of Care Reviewed With: (P) patient, son  Progress: (P)  (eval)             Time Calculation:         Time Calculation- SLP     Row Name 07/03/18 1020             Time Calculation- SLP    SLP Start Time (P)  0945  -LW      SLP Received On (P)  07/03/18  -        User Key  (r) = Recorded By, (t) = Taken By, (c) = Cosigned By    Initials Name Provider Type    LW Katey Dixon, Speech Therapy Student Speech Therapy Student          Therapy Charges for Today     Code Description Service Date Service Provider Modifiers Qty    [id number]  ST EVAL ORAL PHARYNG SWALLOW 2 7/3/2018 Katey Dixon Speech Therapy Student GN 1               Katey Dixon Speech Therapy Student  7/3/2018

## 2018-07-03 NOTE — THERAPY EVALUATION
Acute Care - Physical Therapy Initial Evaluation  [facility]     Patient Name: Perla Ivan  : 1929  MRN: [id number]  Today's Date: 7/3/2018      Date of Referral to PT: (P) 18  Referring Physician: LALY) MD Tobias       Admit Date: 2018    Visit Dx:     ICD-10-CM ICD-9-CM   1. Chronic congestive heart failure, unspecified congestive heart failure type (CMS/HCC) I50.9 428.0   2. Declining functional status R53.81 799.3   3. Generalized weakness R53.1 780.79   4. Hypoxemia requiring supplemental oxygen R09.02 799.02    Z99.81    5. History of atrial fibrillation Z86.79 V12.59   6. Impaired functional mobility, balance, gait, and endurance Z74.09 V49.89     Patient Active Problem List   Diagnosis   • Acute on chronic congestive heart failure (CMS/HCC)   • Weakness   • Fall   • Atrial fibrillation, chronic (CMS/HCC)   • Dementia   • Essential hypertension   • Hyperlipidemia   • Hypothyroid   • Pneumonia      Past Medical History:   Diagnosis Date   • Atrial fibrillation (CMS/HCC)    • Atrial fibrillation (CMS/HCC)    • Dementia    • Disease of thyroid gland    • Hyperlipidemia    • Hypertension    • Vertigo      Past Surgical History:   Procedure Laterality Date   • BREAST SURGERY     • CYST REMOVAL      L breat and L ovary        PT ASSESSMENT (last 12 hours)      Physical Therapy Evaluation     Row Name 18 0955          PT Evaluation Time/Intention    Subjective Information (P)  no complaints  -CM     Document Type (P)  evaluation  -CM     Mode of Treatment (P)  physical therapy  -CM     Patient Effort (P)  fair  -CM     Symptoms Noted During/After Treatment (P)  fatigue  -CM     Comment (P)  Pt lethargic and demonstrates low arousal   -CM     Row Name 18 0996          General Information    Patient Profile Reviewed? (P)  yes  -CM     Referring Physician (P)  MD Tobias   -CM     Patient Observations (P)  lethargic  -CM     Patient/Family Observations (P)  Son present and provided  information pertaining to living situation and PLOF   -CM     General Observations of Patient (P)  Very lethargic going in and out of sleep during evaluation   -CM     Prior Level of Function (P)  independent:;all household mobility;dressing;gait;transfer;min assist:;home management;cleaning  -CM     Equipment Currently Used at Home (P)  cane, straight;walker, rolling  -CM     Pertinent History of Current Functional Problem (P)  Pt admitted to [facility] w/ complaints of SOA, fever, cough and weakness. Family reports pt has not been able to get out of bed the last 4 days. PMH includes: HTN, HLD, dementia, A-fib, and hypothyroid disease.   -CM     Existing Precautions/Restrictions (P)  fall;oxygen therapy device and L/min  -CM     Limitations/Impairments (P)  safety/cognitive  -CM     Risks Reviewed (P)  patient:;family:;LOB;nausea/vomiting;dizziness;increased discomfort  -CM     Benefits Reviewed (P)  patient:;family:;improve function;increase independence;increase strength;decrease pain;increase balance  -CM     Barriers to Rehab (P)  previous functional deficit;cognitive status  -CM     Row Name 07/03/18 0955          Relationship/Environment    Primary Source of Support/Comfort (P)  child(adrienne)  -CM     Lives With (P)  child(adrienne), adult  -CM     Family Caregiver if Needed (P)  child(adrienne), adult  -CM     Row Name 07/03/18 0955          Resource/Environmental Concerns    Current Living Arrangements (P)  home/apartment/condo  -CM     Resource/Environmental Concerns (P)  none  -CM     Row Name 07/03/18 0955          Cognitive Assessment/Interventions    Additional Documentation (P)  Cognitive Assessment/Intervention (Group)  -CM     Row Name 07/03/18 0955          Cognitive Assessment/Intervention- PT/OT    Affect/Mental Status (Cognitive) (P)  confused;low arousal/lethargic  -CM     Orientation Status (Cognition) (P)  oriented to;person  -CM     Follows Commands (Cognition) (P)  follows one step commands;0-24%  accuracy;verbal cues/prompting required;repetition of directions required;physical/tactile prompts required;visual queue;initiation impaired  -CM     Cognitive Function (Cognitive) (P)  safety deficit  -CM     Safety Deficit (Cognitive) (P)  moderate deficit;safety precautions awareness;insight into deficits/self awareness;awareness of need for assistance;ability to follow commands  -CM     Personal Safety Interventions (P)  fall prevention program maintained;gait belt;nonskid shoes/slippers when out of bed  -CM     Row Name 07/03/18 0955          Safety Issues, Functional Mobility    Safety Issues Affecting Function (Mobility) (P)  ability to follow commands;awareness of need for assistance;insight into deficits/self awareness;safety precaution awareness  -CM     Impairments Affecting Function (Mobility) (P)  balance;cognition;endurance/activity tolerance;strength  -CM     Row Name 07/03/18 0955          Bed Mobility Assessment/Treatment    Bed Mobility Assessment/Treatment (P)  sit-supine;supine-sit;scooting/bridging  -CM     Scooting/Bridging [place] (Bed Mobility) (P)  maximum assist (25% patient effort);verbal cues;2 person assist  -CM     Supine-Sit [place] (Bed Mobility) (P)  moderate assist (50% patient effort);2 person assist;verbal cues  -CM     Sit-Supine [place] (Bed Mobility) (P)  moderate assist (50% patient effort);2 person assist;nonverbal cues (demo/gesture)  -CM     Bed Mobility, Safety Issues (P)  decreased use of arms for pushing/pulling;decreased use of legs for bridging/pushing;impaired trunk control for bed mobility  -CM     Assistive Device (Bed Mobility) (P)  bed rails;draw sheet;head of bed elevated  -CM     Row Name 07/03/18 0955          Transfer Assessment/Treatment    Transfer Assessment/Treatment (P)  sit-stand transfer;stand-sit transfer  -CM     Sit-Stand [place] (Transfers) (P)  moderate assist (50% patient effort);2 person assist;verbal cues  -CM     Stand-Sit  [place] (Transfers) (P)  moderate assist (50% patient effort);2 person assist;verbal cues  -CM     Row Name 07/03/18 0955          Sit-Stand Transfer    Assistive Device (Sit-Stand Transfers) (P)  walker, front-wheeled  -CM     Row Name 07/03/18 0955          Stand-Sit Transfer    Assistive Device (Stand-Sit Transfers) (P)  walker, front-wheeled  -CM     Row Name 07/03/18 0955          Gait/Stairs Assessment/Training    [place] Level (Gait) (P)  not tested  -CM     Comment (Gait/Stairs) (P)  DNT; will assess at appropriate time   -CM     Row Name 07/03/18 0955          General ROM    GENERAL ROM COMMENTS (P)  BLE's grossly WFL   -CM     Row Name 07/03/18 0955          MMT (Manual Muscle Testing)    Additional Documentation (P)  General Assessment (Manual Muscle Testing) (Group)  -CM     Row Name 07/03/18 0955          General Assessment (Manual Muscle Testing)    Comment, General Manual Muscle Testing (MMT) Assessment (P)  BLE's grossly 3/5; Unable to perform formal MMT due to pt inability to follow commands   -CM     Row Name 07/03/18 0955          Motor Assessment/Intervention    Additional Documentation (P)  Balance (Group)  -CM     Row Name 07/03/18 0955          Balance    Balance (P)  static sitting balance;static standing balance  -CM     Row Name 07/03/18 0955          Static Sitting Balance    Level of [place] (Unsupported Sitting, Static Balance) (P)  minimal assist, 75% patient effort;supervision  -CM     Sitting Position (Unsupported Sitting, Static Balance) (P)  sitting on edge of bed  -CM     Row Name 07/03/18 0955          Static Standing Balance    Level of [place] (Supported Standing, Static Balance) (P)  moderate assist, 50 to 74% patient effort  -CM     Assistive Device Utilized (Supported Standing, Static Balance) (P)  rolling walker  -CM     Row Name 07/03/18 0955          Sensory Assessment/Intervention    Sensory General Assessment (P)  other (see comments)   unable to  formally assess d/t pt unable to respond to VC's   -CM     Row Name 07/03/18 0955          Pain Assessment    Additional Documentation (P)  Pain Scale: FACES Pre/Post-Treatment (Group)  -CM     Row Name 07/03/18 0955          Pain Scale: FACES Pre/Post-Treatment    Pain: FACES Scale, Pretreatment (P)  2-->hurts little bit  -CM     Pain: FACES Scale, Post-Treatment (P)  2-->hurts little bit  -CM     Pre/Post Treatment Pain Comment (P)  BLE's   -CM     Row Name 07/03/18 0955          Coping    Observed Emotional State (P)  accepting;calm  -CM     Verbalized Emotional State (P)  acceptance  -CM     Row Name 07/03/18 0955          Plan of Care Review    Plan of Care Reviewed With (P)  patient;son  -CM     Row Name 07/03/18 0955          Physical Therapy Clinical Impression    Date of Referral to PT (P)  07/03/18  -CM     PT Diagnosis (PT Clinical Impression) (P)  Pt presents w/ generalized weakness in BLE's, balance deficits, decreased independence w/ trasnfers and functional mobility  -CM     Prognosis (PT Clinical Impression) (P)     -CM     Patient/Family Goals Statement (PT Clinical Impression) (P)  to return to plof   -CM     Criteria for Skilled Interventions Met (PT Clinical Impression) (P)  yes;treatment indicated  -     Rehab Potential (PT Clinical Summary) (P)  good, to achieve stated therapy goals  -     Care Plan Review (PT) (P)  evaluation/treatment results reviewed;care plan/treatment goals reviewed;risks/benefits reviewed;patient/other agree to care plan  -     Row Name 07/03/18 0955          Vital Signs    Pre Systolic BP Rehab (P)  137  -CM     Pre Treatment Diastolic BP (P)  78  -CM     Post Systolic BP Rehab (P)  101  -CM     Post Treatment Diastolic BP (P)  60  -CM     Pretreatment Heart Rate (beats/min) (P)  94  -CM     Posttreatment Heart Rate (beats/min) (P)  88  -CM     Pre SpO2 (%) (P)  92  -CM     O2 Delivery Pre Treatment (P)  supplemental O2  -CM     O2 Delivery Intra Treatment (P)   supplemental O2  -CM     Post SpO2 (%) (P)  91  -CM     O2 Delivery Post Treatment (P)  supplemental O2  -CM     Pre Patient Position (P)  Supine  -CM     Intra Patient Position (P)  Sitting  -CM     Post Patient Position (P)  Supine  -CM     Row Name 07/03/18 0955          Physical Therapy Goals    Bed Mobility Goal Selection (PT) (P)  bed mobility, PT goal 1  -CM     Transfer Goal Selection (PT) (P)  transfer, PT goal 1  -CM     Gait Training Goal Selection (PT) (P)  gait training, PT goal 1  -CM     Row Name 07/03/18 0955          Bed Mobility Goal 1 (PT)    Activity/Assistive Device (Bed Mobility Goal 1, PT) (P)  sit to supine/supine to sit  -CM     [place] Level/Cues Needed (Bed Mobility Goal 1, PT) (P)  supervision required  -CM     Time Frame (Bed Mobility Goal 1, PT) (P)  long term goal (LTG);2 weeks  -CM     Progress/Outcomes (Bed Mobility Goal 1, PT) (P)  goal ongoing  -CM     Row Name 07/03/18 0955          Transfer Goal 1 (PT)    Activity/Assistive Device (Transfer Goal 1, PT) (P)  sit-to-stand/stand-to-sit  -CM     [place] Level/Cues Needed (Transfer Goal 1, PT) (P)  supervision required  -CM     Time Frame (Transfer Goal 1, PT) (P)  long term goal (LTG);2 weeks  -CM     Progress/Outcome (Transfer Goal 1, PT) (P)  goal ongoing  -CM     Row Name 07/03/18 0955          Gait Training Goal 1 (PT)    Activity/Assistive Device (Gait Training Goal 1, PT) (P)  gait (walking locomotion);assistive device use;walker, rolling  -CM     [place] Level (Gait Training Goal 1, PT) (P)  moderate assist (50-74% patient effort)  -CM     Distance (Gait Goal 1, PT) (P)  50  -CM     Time Frame (Gait Training Goal 1, PT) (P)  long term goal (LTG);2 weeks  -CM     Progress/Outcome (Gait Training Goal 1, PT) (P)  goal ongoing  -CM     Row Name 07/03/18 0955          Patient Education Goal (PT)    Activity (Patient Education Goal, PT) (P)  HEP  -CM     [place]/Cues/Accuracy (Memory Goal 2, PT) (P)  independent   -CM     Time Frame (Patient Education Goal, PT) (P)  long term goal (LTG);2 weeks  -CM     Progress/Outcome (Patient Education Goal, PT) (P)  goal ongoing  -CM     Row Name 07/03/18 0955          Positioning and Restraints    Pre-Treatment Position (P)  in bed  -CM     Post Treatment Position (P)  bed  -CM     In Bed (P)  fowlers;call light within reach;with nsg;side rails up x2  -CM       User Key  (r) = Recorded By, (t) = Taken By, (c) = Cosigned By    Initials Name Provider Type    CM Lilia Kendrick, PT Student PT Student          Physical Therapy Education     Title: PT OT SLP Therapies (Active)     Topic: Physical Therapy (Active)     Point: Mobility training (Active)    Learning Progress Summary     Learner Status Readiness Method Response Comment Documented by    Patient Active Acceptance E,D NR  CM 07/03/18 0955          Point: Home exercise program (Active)    Learning Progress Summary     Learner Status Readiness Method Response Comment Documented by    Patient Active Acceptance E,D NR  CM 07/03/18 0955          Point: Body mechanics (Active)    Learning Progress Summary     Learner Status Readiness Method Response Comment Documented by    Patient Active Acceptance E,D NR  CM 07/03/18 0955          Point: Precautions (Active)    Learning Progress Summary     Learner Status Readiness Method Response Comment Documented by    Patient Active Acceptance E,D NR  CM 07/03/18 0955                      User Key     Initials Effective Dates Name Provider Type [facility] 06/07/18 -  Lilia Kendrick, PT Student PT Student PT                PT Recommendation and Plan  Anticipated Discharge Disposition (PT): (P) skilled nursing facility  Planned Therapy Interventions (PT Eval): (P) balance training, bed mobility training, gait training, home exercise program, patient/family education, strengthening, transfer training  Therapy Frequency (PT Clinical Impression): (P) daily  Outcome Summary/Treatment Plan  (PT)  Anticipated Discharge Disposition (PT): (P) skilled nursing facility  Plan of Care Reviewed With: (P) patient, son  Outcome Summary: (P) PT eval performed. Pt demonstrates generalized weakness in BLE's, balance deficts, and decreased safety awareness decreasing independence w/ functional mobility and transfers. Pt would benefit from skilled PT services w/ recommended d/c disposition to SNF.           Outcome Measures     Row Name 07/03/18 0955             How much help from another person do you currently need...    Turning from your back to your side while in flat bed without using bedrails? (P)  2  -CM      Moving from lying on back to sitting on the side of a flat bed without bedrails? (P)  2  -CM      Moving to and from a bed to a chair (including a wheelchair)? (P)  2  -CM      Standing up from a chair using your arms (e.g., wheelchair, bedside chair)? (P)  2  -CM      Climbing 3-5 steps with a railing? (P)  1  -CM      To walk in hospital room? (P)  2  -CM      AM-PAC 6 Clicks Score (P)  11  -CM         Functional Assessment    Outcome Measure Options (P)  AM-PAC 6 Clicks Basic Mobility (PT)  -CM        User Key  (r) = Recorded By, (t) = Taken By, (c) = Cosigned By    Initials Name Provider Type    ANA Kendrick, PT Student PT Student           Time Calculation:         PT Charges     Row Name 07/03/18 0955             Time Calculation    Start Time (P)  0955  -CM      PT Received On (P)  07/03/18  -CM      PT Goal Re-Cert Due Date (P)  07/13/18  -CM        User Key  (r) = Recorded By, (t) = Taken By, (c) = Cosigned By    Initials Name Provider Type    ANA Kendrick, PT Student PT Student        Therapy Suggested Charges     Code   Minutes Charges    None           Therapy Charges for Today     Code Description Service Date Service Provider Modifiers Qty    39329968936 HC PT EVAL HIGH COMPLEXITY 4 7/3/2018 Lilia Kendrick, PT Student GP 1          PT G-Codes  Outcome Measure Options: (P)  AM-PAC 6 Clicks Basic Mobility (PT)      Lilia Kendrick, PT Student  7/3/2018

## 2018-07-03 NOTE — PLAN OF CARE
Problem: Patient Care Overview  Goal: Plan of Care Review  Outcome: Ongoing (interventions implemented as appropriate)   07/03/18 0841   Coping/Psychosocial   Plan of Care Reviewed With patient;other (see comments)  (STEVEN Mccullough)   Plan of Care Review   Progress no change   OTHER   Outcome Summary WO nurse consult for excoriation to bilateral groin. Pt with low Josemanuel of 13. Head to toe skin assessment done. No pressure injuries identified. Pt has MASD to bilateral groin. Barrier cream applied. Pt with purewick in place. Per RN pt has nystatin powder ordered. I recommend no nystatin, use Interdry to bilateral groins. Clean with foam  and wipes, place Interdry to wick out 2 inches, change every 3 days and PRN soiling. Has waffle mattress on at this time. May need speciality bed if no improvement with mobility. [facility] nurse to monitor. Please call with questions or concerns. Thank you

## 2018-07-03 NOTE — PROGRESS NOTES
Continued Stay Note  [facility]     Patient Name: Perla Ivan  MRN: [id number]  Today's Date: 7/3/2018    Admit Date: 7/2/2018          Discharge Plan     Row Name 07/03/18 1108       Plan    Plan Rehab/Ongoing     Patient/Family in Agreement with Plan yes    Plan Comments Spoke with son Delroy at bedside, patient sleeping. Patient lives with dtr Demi Ivan @ [phone] in [place]. Patient has a hired caregiver 4 days per week from 630 to 300. Children cover weekends. Recently, patient unable to use cane had to use walker for mobility. Family interested in rehab before home. Last week patient interested in [facility], unable to go there 2/2 no inpatient stay for Medicare to pay for service. Pt/Ot ordered, still needs to work with patient. Once therapy notes in chart then CM will contact [facility] and [place]. CM following. Sherrie @ [phone]     Row Name 07/03/18 1102       Plan    Plan Rehab/Ongoing               Discharge Codes    No documentation.           Kim Figueroa RN

## 2018-07-04 ENCOUNTER — APPOINTMENT (OUTPATIENT)
Dept: GENERAL RADIOLOGY | Facility: HOSPITAL | Age: 83
End: 2018-07-04

## 2018-07-04 LAB
ANION GAP SERPL CALCULATED.3IONS-SCNC: 10 MMOL/L (ref 3–11)
BUN BLD-MCNC: 18 MG/DL (ref 9–23)
BUN/CREAT SERPL: 28.1 (ref 7–25)
CALCIUM SPEC-SCNC: 8.6 MG/DL (ref 8.7–10.4)
CHLORIDE SERPL-SCNC: 101 MMOL/L (ref 99–109)
CO2 SERPL-SCNC: 28 MMOL/L (ref 20–31)
CREAT BLD-MCNC: 0.64 MG/DL (ref 0.6–1.3)
GFR SERPL CREATININE-BSD FRML MDRD: 88 ML/MIN/1.73
GLUCOSE BLD-MCNC: 137 MG/DL (ref 70–100)
POTASSIUM BLD-SCNC: 3.8 MMOL/L (ref 3.5–5.5)
SODIUM BLD-SCNC: 139 MMOL/L (ref 132–146)

## 2018-07-04 PROCEDURE — 97535 SELF CARE MNGMENT TRAINING: CPT

## 2018-07-04 PROCEDURE — 25010000002 CEFTRIAXONE PER 250 MG: Performed by: NURSE PRACTITIONER

## 2018-07-04 PROCEDURE — 94799 UNLISTED PULMONARY SVC/PX: CPT

## 2018-07-04 PROCEDURE — 25010000002 FUROSEMIDE PER 20 MG: Performed by: NURSE PRACTITIONER

## 2018-07-04 PROCEDURE — 94760 N-INVAS EAR/PLS OXIMETRY 1: CPT

## 2018-07-04 PROCEDURE — 71045 X-RAY EXAM CHEST 1 VIEW: CPT

## 2018-07-04 PROCEDURE — 97530 THERAPEUTIC ACTIVITIES: CPT

## 2018-07-04 PROCEDURE — 94640 AIRWAY INHALATION TREATMENT: CPT

## 2018-07-04 PROCEDURE — 80048 BASIC METABOLIC PNL TOTAL CA: CPT | Performed by: HOSPITALIST

## 2018-07-04 PROCEDURE — 99233 SBSQ HOSP IP/OBS HIGH 50: CPT | Performed by: INTERNAL MEDICINE

## 2018-07-04 PROCEDURE — 25010000002 AZITHROMYCIN: Performed by: NURSE PRACTITIONER

## 2018-07-04 PROCEDURE — 97110 THERAPEUTIC EXERCISES: CPT

## 2018-07-04 RX ORDER — DABIGATRAN ETEXILATE 150 MG/1
150 CAPSULE ORAL EVERY 12 HOURS SCHEDULED
Status: DISCONTINUED | OUTPATIENT
Start: 2018-07-04 | End: 2018-07-06 | Stop reason: HOSPADM

## 2018-07-04 RX ORDER — QUETIAPINE FUMARATE 25 MG/1
25 TABLET, FILM COATED ORAL NIGHTLY PRN
Status: DISCONTINUED | OUTPATIENT
Start: 2018-07-04 | End: 2018-07-05

## 2018-07-04 RX ADMIN — POTASSIUM CHLORIDE 20 MEQ: 750 CAPSULE, EXTENDED RELEASE ORAL at 09:54

## 2018-07-04 RX ADMIN — DABIGATRAN ETEXILATE MESYLATE 150 MG: 150 CAPSULE ORAL at 09:55

## 2018-07-04 RX ADMIN — CARVEDILOL 6.25 MG: 6.25 TABLET, FILM COATED ORAL at 09:53

## 2018-07-04 RX ADMIN — IPRATROPIUM BROMIDE 0.5 MG: 0.5 SOLUTION RESPIRATORY (INHALATION) at 07:35

## 2018-07-04 RX ADMIN — GUAIFENESIN 600 MG: 600 TABLET, EXTENDED RELEASE ORAL at 20:36

## 2018-07-04 RX ADMIN — IPRATROPIUM BROMIDE 0.5 MG: 0.5 SOLUTION RESPIRATORY (INHALATION) at 12:49

## 2018-07-04 RX ADMIN — DILTIAZEM HYDROCHLORIDE 180 MG: 180 CAPSULE, COATED, EXTENDED RELEASE ORAL at 10:12

## 2018-07-04 RX ADMIN — IPRATROPIUM BROMIDE 0.5 MG: 0.5 SOLUTION RESPIRATORY (INHALATION) at 16:28

## 2018-07-04 RX ADMIN — CEFTRIAXONE SODIUM 1 G: 1 INJECTION, SOLUTION INTRAVENOUS at 09:54

## 2018-07-04 RX ADMIN — FAMOTIDINE 40 MG: 20 TABLET, FILM COATED ORAL at 09:54

## 2018-07-04 RX ADMIN — FAMOTIDINE 40 MG: 20 TABLET, FILM COATED ORAL at 20:36

## 2018-07-04 RX ADMIN — Medication 250 MG: at 09:54

## 2018-07-04 RX ADMIN — FUROSEMIDE 20 MG: 10 INJECTION, SOLUTION INTRAMUSCULAR; INTRAVENOUS at 07:01

## 2018-07-04 RX ADMIN — LEVOTHYROXINE SODIUM 50 MCG: 50 TABLET ORAL at 07:01

## 2018-07-04 RX ADMIN — QUETIAPINE FUMARATE 25 MG: 25 TABLET ORAL at 20:36

## 2018-07-04 RX ADMIN — CARVEDILOL 6.25 MG: 6.25 TABLET, FILM COATED ORAL at 20:37

## 2018-07-04 RX ADMIN — ALPRAZOLAM 0.25 MG: 0.25 TABLET ORAL at 17:25

## 2018-07-04 RX ADMIN — LOSARTAN POTASSIUM 50 MG: 50 TABLET ORAL at 09:54

## 2018-07-04 RX ADMIN — Medication 250 MG: at 20:36

## 2018-07-04 RX ADMIN — DABIGATRAN ETEXILATE MESYLATE 150 MG: 150 CAPSULE ORAL at 21:04

## 2018-07-04 RX ADMIN — AZITHROMYCIN MONOHYDRATE 500 MG: 500 INJECTION, POWDER, LYOPHILIZED, FOR SOLUTION INTRAVENOUS at 12:38

## 2018-07-04 RX ADMIN — CITALOPRAM HYDROBROMIDE 20 MG: 20 TABLET ORAL at 09:54

## 2018-07-04 RX ADMIN — GUAIFENESIN 600 MG: 600 TABLET, EXTENDED RELEASE ORAL at 09:53

## 2018-07-04 NOTE — PROGRESS NOTES
[facility]  PROGRESS NOTE    Patient Name: Perla Ivan  : 1929  MRN: [id number]    Date of Admission: 2018  Length of Stay: 2  Primary Care Physician: Kar Munoz DO    Subjective   Subjective     CC:dyspnea    HPI:No acute events overnight, patient awake and alert but confused, does not want to be here    Review of Systems  UTO complete ROS sec to mental status    Otherwise ROS is negative except as mentioned in the HPI.    Objective   Objective     Vital Signs:   Temp:  [96.9 °F (36.1 °C)-97.9 °F (36.6 °C)] 96.9 °F (36.1 °C)  Heart Rate:  [68-98] 90  Resp:  [16-21] 21  BP: (102-139)/(54-87) 138/87        Physical Exam:  Constitutional: No acute distress, awake, alert, seated in bed  Eyes: PERRLA, sclerae anicteric, no conjunctival injection  HENT: NCAT, mucous membranes moist  Neck: Supple, no thyromegaly, no lymphadenopathy, trachea midline  Respiratory: mod labored respirations, no wheezes  Cardiovascular: RRR, no murmurs, rubs, or gallops, palpable pedal pulses bilaterally  Gastrointestinal: Positive bowel sounds, soft, nontender, nondistended  Musculoskeletal: No bilateral ankle edema, no clubbing or cyanosis to extremities  Psychiatric: Appropriate affect, cooperative  Neurologic: Oriented x 1, no focal deficits  Skin: No rashes    Results Reviewed:  I have personally reviewed current lab, radiology, and data and agree.      Results from last 7 days  Lab Units 18  1444 18  1605   WBC 10*3/mm3 13.83* 13.29* 13.77*   HEMOGLOBIN g/dL 12.1 11.9 11.6*   HEMATOCRIT % 38.8 36.6 35.3*   PLATELETS 10*3/mm3 259 282 243       Results from last 7 days  Lab Units 18  0603 18  04318  1444 18  1605   SODIUM mmol/L 139  --  138 140 136*   POTASSIUM mmol/L 3.8 3.9 3.2* 3.6 4.5   CHLORIDE mmol/L 101  --  99 103 102   CO2 mmol/L 28.0  --  29.0 28.0 28.0   BUN mg/dL 18  --  8* 11 15   CREATININE  mg/dL 0.64  --  0.72 0.74 0.70   GLUCOSE mg/dL 137*  --  135* 127* 124*   CALCIUM mg/dL 8.6*  --  9.0 9.6 9.0   ALT (SGPT) U/L  --   --   --  10 21   AST (SGOT) U/L  --   --   --  17 23   TROPONIN I ng/mL  --   --   --   --  <0.012     Estimated Creatinine Clearance: 50.6 mL/min (by C-G formula based on SCr of 0.64 mg/dL).  BNP   Date Value Ref Range Status   07/02/2018 394.0 (H) 0.0 - 100.0 pg/mL Final     Comment:     Results may be falsely decreased if patient taking Biotin.       Microbiology Results Abnormal     Procedure Component Value - Date/Time    Blood Culture - Blood, [[id number]]  (Normal) Collected:  07/02/18 2022    Lab Status:  Preliminary result Specimen:  Blood from Arm, Left Updated:  07/03/18 2030     Blood Culture No growth at 24 hours          Imaging Results (last 24 hours)     Procedure Component Value Units Date/Time    XR Chest 1 View [[id number]] Updated:  07/04/18 0337        Results for orders placed during the hospital encounter of 07/02/18   Adult Transthoracic Echo Complete W/ Cont if Necessary Per Protocol    Narrative · Left ventricular wall thickness is consistent with mild concentric   hypertrophy.  · Mild mitral valve regurgitation is present.  · Mild pulmonic valve regurgitation is present.  · Mild to moderate tricuspid valve regurgitation is present.  · Calculated right ventricular systolic pressure from tricuspid   regurgitation is 39 mmHg.  · Left ventricular systolic function is normal. Estimated EF = 75%.  · There is no evidence of pericardial effusion.  · Mild pulmonary hypertension is present.  · MAC is present.  · The aortic valve exhibits sclerosis.  · Normal right ventricular cavity size, wall thickness, systolic function   and septal motion noted.          I have reviewed the medications.    Assessment/Plan   Assessment / Plan     Hospital Problem List     * (Principal)Acute on chronic congestive heart failure (CMS/HCC)    Weakness    Fall    Atrial fibrillation,  chronic (CMS/HCC)    Dementia    Essential hypertension    Hyperlipidemia    Hypothyroid    Pneumonia          Brief Hospital Course to date:  Perla Ivan is a 88 y.o. female with hx of dementia, hypertension, Afib who presented with dyspnea likely sec to A/C chf and pna.    Assessment & Plan:  - Acute respiratory failure with hypoxia etiology likely multifactorial sec to a/c chf exacerbation and pneumonia, continue abx, duonebs, steroids, continue to diurese as tolerated, f/u echo  - Afib continue pradaxa  - hx of weakness with falls, PT/OT following  - Prognosis is guarded, considering advanced dementia, lethargy,weakness and falls decreased functional status palliative consulted and following on GOC    DVT Prophylaxis:  On Pradaxa    CODE STATUS:   Code Status and Medical Interventions:   Ordered at: 07/02/18 1932     Code Status:    CPR     Medical Interventions (Level of Support Prior to Arrest):    Full     Disposition: tbd    Electronically signed by Raulito Stratton MD, 07/04/18, 10:28 AM.

## 2018-07-04 NOTE — PLAN OF CARE
Problem: Patient Care Overview  Goal: Plan of Care Review  Outcome: Ongoing (interventions implemented as appropriate)   07/04/18 1301   Coping/Psychosocial   Plan of Care Reviewed With patient   OTHER   Outcome Summary Pt alert and cooperative, able to participate with oob to chair with mod assist x 2, 3-4 side steps bed to chair. Progressing with plan of care on this date

## 2018-07-04 NOTE — THERAPY TREATMENT NOTE
Acute Care - Occupational Therapy Treatment Note  [facility]     Patient Name: Perla Ivan  : 1929  MRN: [id number]  Today's Date: 2018     Date of Referral to OT: 18  Referring Physician: [id number]    Admit Date: 2018       ICD-10-CM ICD-9-CM   1. Chronic congestive heart failure, unspecified congestive heart failure type (CMS/HCC) I50.9 428.0   2. Declining functional status R53.81 799.3   3. Generalized weakness R53.1 780.79   4. Hypoxemia requiring supplemental oxygen R09.02 799.02    Z99.81    5. History of atrial fibrillation Z86.79 V12.59   6. Impaired functional mobility, balance, gait, and endurance Z74.09 V49.89   7. Impaired mobility and ADLs Z74.09 799.89     Patient Active Problem List   Diagnosis   • Acute on chronic congestive heart failure (CMS/HCC)   • Weakness   • Fall   • Atrial fibrillation, chronic (CMS/HCC)   • Dementia   • Essential hypertension   • Hyperlipidemia   • Hypothyroid   • Pneumonia      Past Medical History:   Diagnosis Date   • Atrial fibrillation (CMS/HCC)    • Atrial fibrillation (CMS/HCC)    • Dementia    • Disease of thyroid gland    • Hyperlipidemia    • Hypertension    • Vertigo      Past Surgical History:   Procedure Laterality Date   • BREAST SURGERY     • CYST REMOVAL      L breat and L ovary       Therapy Treatment          Rehabilitation Treatment Summary     Row Name 18 1355 18 1301          Treatment Time/Intention    Discipline occupational therapist  -JR physical therapist  -KM     Document Type therapy note (daily note)  -JR therapy note (daily note)  -KM     Subjective Information no complaints  -JR no complaints   resting with eyes closed, arousable and alert  -KM     Mode of Treatment occupational therapy  -JR physical therapy  -KM     Care Plan Review risks/benefits reviewed;patient/other agree to care plan  -JR  --     Therapy Frequency (PT Clinical Impression)  -- daily  -KM     Patient Effort good  -JR good  -KM      Existing Precautions/Restrictions fall;oxygen therapy device and L/min   dementia  -JR fall;oxygen therapy device and L/min   dementia, pure wick catheter  -KM     Recorded by [JR] Maria De Jesus Witt, OT 07/04/18 1426 [KM] Michelle Carroll, PT 07/04/18 1335     Row Name 07/04/18 1355             Vital Signs    Pre Systolic BP Rehab 131  -JR      Pre Treatment Diastolic BP 84  -JR      Post Systolic BP Rehab 107  -JR      Post Treatment Diastolic BP 66  -JR      Pretreatment Heart Rate (beats/min) 77  -JR      Posttreatment Heart Rate (beats/min) 72  -JR      Pre SpO2 (%) 92  -JR      O2 Delivery Pre Treatment supplemental O2   3 1/2  -JR      Post SpO2 (%) 72  -JR      O2 Delivery Post Treatment supplemental O2  -JR      Pre Patient Position Sitting  -JR      Intra Patient Position Sitting  -JR      Post Patient Position Sitting  -JR      Rest Breaks  8  -JR      Recorded by [JR] Maria De Jesus Witt, OT 07/04/18 1426      Row Name 07/04/18 1355 07/04/18 1301          Cognitive Assessment/Intervention- PT/OT    Affect/Mental Status (Cognitive) confused  -JR confused  -KM     Orientation Status (Cognition) oriented to;person  -JR oriented to;person  -KM     Follows Commands (Cognition) follows one step commands;75-90% accuracy;verbal cues/prompting required  -JR follows one step commands;75-90% accuracy;delayed response/completion;initiation impaired;verbal cues/prompting required  -KM     Cognitive Function (Cognitive) attention deficit;executive function deficit;memory deficit;safety deficit  -JR safety deficit  -KM     Attention Deficit (Cognitive) severe deficit  -JR  --     Executive Function Deficit (Cognition) severe deficit  -JR  --     Memory Deficit (Cognitive) severe deficit  -JR  --     Safety Deficit (Cognitive) severe deficit  -JR  --     Personal Safety Interventions  -- fall prevention program maintained  -KM     Recorded by [JR] Maria De Jesus Witt, OT 07/04/18 1426 [KM] Michelle Carroll, PT  07/04/18 1335     Row Name 07/04/18 1301             Safety Issues, Functional Mobility    Impairments Affecting Function (Mobility) balance;cognition  -KM      Recorded by [KM] Michelle Carroll, PT 07/04/18 1335      Row Name 07/04/18 1355 07/04/18 1301          Bed Mobility Assessment/Treatment    Bed Mobility Assessment/Treatment  -- supine-sit;scooting/bridging  -KM     Rolling Left [place] (Bed Mobility)  -- verbal cues;moderate assist (50% patient effort)  -KM     Scooting/Bridging [place] (Bed Mobility)  -- verbal cues;moderate assist (50% patient effort)  -KM     Supine-Sit [place] (Bed Mobility)  -- verbal cues;moderate assist (50% patient effort)  -KM     Bed Mobility, Safety Issues  -- cognitive deficits limit understanding;decreased use of arms for pushing/pulling;decreased use of legs for bridging/pushing;impaired trunk control for bed mobility  -KM     Assistive Device (Bed Mobility)  -- bed rails;draw sheet;head of bed elevated  -KM     Comment (Bed Mobility) up in chair  -JR --   dyspnea with exertion  -KM     Recorded by [JR] Maria De Jesus Witt, OT 07/04/18 1426 [KM] Michelle Carroll, PT 07/04/18 1335     Row Name 07/04/18 1301             Transfer Assessment/Treatment    Transfer Assessment/Treatment sit-stand transfer  -KM      Recorded by [KM] Michelle Carroll, PT 07/04/18 1335      Row Name 07/04/18 1301             Sit-Stand Transfer    Sit-Stand [place] (Transfers) moderate assist (50% patient effort);2 person assist  -KM      Recorded by [KM] Michelle Carroll, PT 07/04/18 1335      Row Name 07/04/18 1301             Stand-Sit Transfer    Stand-Sit [place] (Transfers) moderate assist (50% patient effort);2 person assist  -KM      Recorded by [KM] Michelle Carroll, PT 07/04/18 1335      Row Name 07/04/18 1301             Gait/Stairs Assessment/Training    Gait/Stairs Assessment/Training --   bed to chair with 3-4 sidesteps mod a x 2  -KM       Recorded by [KM] Michelle Carroll, PT 07/04/18 1335      Row Name 07/04/18 1355             ADL Assessment/Intervention    33415 - OT Self Care/Mgmt Minutes 11  -JR      BADL Assessment/Intervention grooming  -JR      Recorded by [JR] Maria De Jesus Witt, OT 07/04/18 1426      Row Name 07/04/18 1355             Grooming Assessment/Training    [place] Level (Grooming) hair care, combing/brushing;wash face, hands;set up  -JR      Grooming Position supported sitting  -JR      Recorded by [JR] Maria De Jesus Witt, OT 07/04/18 1426      Row Name 07/04/18 1301             Motor Skills Assessment/Interventions    Additional Documentation Therapeutic Exercise (Group)  -KM      Recorded by [KM] Michelle Carroll, PT 07/04/18 1335      Row Name 07/04/18 1355 07/04/18 1301          Therapeutic Exercise    29904 - PT Therapeutic Exercise Minutes  -- 11  -KM     95820 - PT Therapeutic Activity Minutes  -- 12  -KM     07467 - OT Therapeutic Activity Minutes 12  -JR  --     Recorded by [JR] Maria De Jesus Witt, OT 07/04/18 1426 [KM] Michelle Carroll, PT 07/04/18 1335     Row Name 07/04/18 1355 07/04/18 1301          Therapeutic Exercise    Upper Extremity Range of Motion (Therapeutic Exercise) shoulder flexion/extension, bilateral;shoulder abduction/adduction, bilateral;elbow flexion/extension, bilateral;forearm supination/pronation, bilateral  -JR shoulder flexion/extension, bilateral;shoulder abduction/adduction, bilateral;elbow flexion/extension, bilateral  -KM     Lower Extremity (Therapeutic Exercise)  -- gastroc stretch, bilateral;heel slides, bilateral;LAQ (long arc quad), bilateral;marching while seated;SLR (straight leg raise), bilateral  -KM     Lower Extremity Range of Motion (Therapeutic Exercise)  -- ankle dorsiflexion/plantar flexion, bilateral  -KM     Exercise Type (Therapeutic Exercise) AROM (active range of motion)  -JR AAROM (active assistive range of motion)  -KM     Position (Therapeutic Exercise)  seated  -JR seated  -KM     Sets/Reps (Therapeutic Exercise) 10 reps, 2 sets  -JR 1/10  -KM     Recorded by [JR] Maria De Jesus Witt, OT 07/04/18 1426 [KM] Michelle Carroll, PT 07/04/18 1335     Row Name 07/04/18 1301             Static Sitting Balance    Level of [place] (Unsupported Sitting, Static Balance) contact guard assist  -KM      Sitting Position (Unsupported Sitting, Static Balance) sitting on edge of bed  -KM      Time Able to Maintain Position (Unsupported Sitting, Static Balance) 3 to 4 minutes  -KM      Recorded by [KM] Michelle Carroll, PT 07/04/18 1335      Row Name 07/04/18 1301             Static Standing Balance    Level of [place] (Supported Standing, Static Balance) moderate assist, 50 to 74% patient effort  -KM      Recorded by [KM] Michelle Carroll, PT 07/04/18 1335      Row Name 07/04/18 1355 07/04/18 1301          Positioning and Restraints    Pre-Treatment Position sitting in chair/recliner  -JR in bed  -KM     Post Treatment Position chair  -JR chair  -KM     In Chair notified nsg;reclined;call light within reach;encouraged to call for assist;exit alarm on;on mechanical lift sling  -JR reclined;call light within reach;encouraged to call for assist;exit alarm on  -KM     Recorded by [JR] Maria De Jesus Witt, OT 07/04/18 1426 [KM] Michelle Carroll, PT 07/04/18 1335     Row Name 07/04/18 1301             Pain Assessment    Additional Documentation Pain Scale: Numbers Pre/Post-Treatment (Group)  -KM      Recorded by [KM] Michelle Carroll, PT 07/04/18 1335      Row Name 07/04/18 1355 07/04/18 1301          Pain Scale: Numbers Pre/Post-Treatment    Pain Scale: Numbers, Pretreatment 0/10 - no pain  -JR 0/10 - no pain  -KM     Pain Scale: Numbers, Post-Treatment 0/10 - no pain  -JR 0/10 - no pain  -KM     Recorded by [JR] Maria De Jesus Witt, OT 07/04/18 1426 [KM] Michelle Carroll, PT 07/04/18 1335     Row Name 07/04/18 1355 07/04/18 1301          Plan of Care  Review    Plan of Care Reviewed With patient  -JR patient  -KM     Recorded by [JR] Maria De Jesus Witt, OT 07/04/18 1426 [KM] Michelle Carroll, PT 07/04/18 1335     Row Name 07/04/18 1355             Outcome Summary/Treatment Plan (OT)    Daily Summary of Progress (OT) progress toward functional goals is good  -JR      Barriers to Overall Progress (OT) Cognitive status  -JR      Plan for Continued Treatment (OT) Continue OT per POC  -JR      Anticipated Discharge Disposition (OT) skilled nursing [facility]  -JR      Recorded by [JR] Maria De Jesus BONILLA Eduar, OT 07/04/18 1426        User Key  (r) = Recorded By, (t) = Taken By, (c) = Cosigned By    Initials Name Effective Dates Discipline    KM Michelle Carroll, PT 06/19/15 -  PT    JR Maria De Jesus Witt, OT 06/22/15 -  OT               Occupational Therapy Education     Title: PT OT SLP Therapies (Active)     Topic: Occupational Therapy (Active)     Point: ADL training (Active)     Description: Instruct learner(s) on proper safety adaptation and remediation techniques during self care or transfers.   Instruct in proper use of assistive devices.   Learning Progress Summary     Learner Status Readiness Method Response Comment Documented by    Patient Active Acceptance E NR Educated pt regarding UE HEP and reinforced call tolentino.  07/04/18 1427          Point: Home exercise program (Active)     Description: Instruct learner(s) on appropriate technique for monitoring, assisting and/or progressing therapeutic exercises/activities.   Learning Progress Summary     Learner Status Readiness Method Response Comment Documented by    Patient Active Acceptance E NR Educated pt regarding UE HEP and reinforced call tolentino.  07/04/18 1427                      User Key     Initials Effective Dates Name Provider Type Discipline     06/22/15 -  Maria De Jesus R Eduar, OT Occupational Therapist OT                OT Recommendation and Plan  Outcome Summary/Treatment Plan (OT)  Daily Summary of  Progress (OT): progress toward functional goals is good  Barriers to Overall Progress (OT): Cognitive status  Plan for Continued Treatment (OT): Continue OT per POC  Anticipated Discharge Disposition (OT): skilled nursing facility  Daily Summary of Progress (OT): progress toward functional goals is good  Plan of Care Review  Plan of Care Reviewed With: patient  Plan of Care Reviewed With: patient  Outcome Summary: Pt confused but cooperative.Improved command following this date. Continue OT per POC        Outcome Measures     Row Name 07/04/18 1355 07/04/18 1301 07/03/18 1616       How much help from another person do you currently need...    Turning from your back to your side while in flat bed without using bedrails?  -- 2  -KM  --    Moving from lying on back to sitting on the side of a flat bed without bedrails?  -- 2  -KM  --    Moving to and from a bed to a chair (including a wheelchair)?  -- 2  -KM  --    Standing up from a chair using your arms (e.g., wheelchair, bedside chair)?  -- 2  -KM  --    Climbing 3-5 steps with a railing?  -- 1  -KM  --    To walk in hospital room?  -- 2  -KM  --    AM-PAC 6 Clicks Score  -- 11  -KM  --       How much help from another is currently needed...    Putting on and taking off regular lower body clothing? 1  -JR  -- 1  -AR    Bathing (including washing, rinsing, and drying) 1  -JR  -- 1  -AR    Toileting (which includes using toilet bed pan or urinal) 1  -JR  -- 1  -AR    Putting on and taking off regular upper body clothing 2  -JR  -- 2  -AR    Taking care of personal grooming (such as brushing teeth) 3  -JR  -- 2  -AR    Eating meals 2  -JR  -- 2  -AR    Score 10  -JR  -- 9  -AR       Functional Assessment    Outcome Measure Options AM-PAC 6 Clicks Daily Activity (OT)  -JR AM-PAC 6 Clicks Basic Mobility (PT)  -KM AM-PAC 6 Clicks Daily Activity (OT)  -AR    Row Name 07/03/18 0968             How much help from another person do you currently need...    Turning from your  back to your side while in flat bed without using bedrails? 2  -KOLE (r) CM (t) KOLE (c)      Moving from lying on back to sitting on the side of a flat bed without bedrails? 2  -KOLE (r) CM (t) KOLE (c)      Moving to and from a bed to a chair (including a wheelchair)? 2  -KOLE (r) CM (t) KOLE (c)      Standing up from a chair using your arms (e.g., wheelchair, bedside chair)? 2  -KOLE (r) CM (t) KOLE (c)      Climbing 3-5 steps with a railing? 1  -KOLE (r) CM (t) KOLE (c)      To walk in hospital room? 2  -KOLE (r) CM (t) KOLE (c)      AM-PAC 6 Clicks Score 11  -KOLE (r) CM (t)         Functional Assessment    Outcome Measure Options AM-PAC 6 Clicks Basic Mobility (PT)  -KOLE (r) CM (t) KOLE (c)        User Key  (r) = Recorded By, (t) = Taken By, (c) = Cosigned By    Initials Name Provider Type    KOLE Hurley, PT Physical Therapist    NAKIA Carroll, PT Physical Therapist    JR Maria De Jesus Witt, OT Occupational Therapist    AR Karley Silva, OT Occupational Therapist    CM Lilia Kendrick, PT Student PT Student           Time Calculation:         Time Calculation- OT     Row Name 07/04/18 1355             Time Calculation- OT    OT Start Time 1355  -      Total Timed Code Minutes- OT 23 minute(s)  -      OT Received On 07/04/18  -         Timed Charges    50782 - OT Therapeutic Activity Minutes 12  -JR      46544 - OT Self Care/Mgmt Minutes 11  -        User Key  (r) = Recorded By, (t) = Taken By, (c) = Cosigned By    Initials Name Provider Type    JR Maria De Jesus Witt, OT Occupational Therapist           Therapy Suggested Charges     Code   Minutes Charges    34345 (CPT®) Hc Ot Neuromusc Re Education Ea 15 Min      11647 (CPT®) Hc Ot Ther Proc Ea 15 Min      22262 (CPT®) Hc Gait Training Ea 15 Min      52188 (CPT®) Hc Ot Therapeutic Act Ea 15 Min 12 1    62651 (CPT®) Hc Ot Manual Therapy Ea 15 Min      71082 (CPT®) Hc Ot Iontophoresis Ea 15 Min      46022 (CPT®) Hc Ot Elec Stim Ea-Per 15 Min      46809 (CPT®)  Hc Ot Ultrasound Ea 15 Min      04735 (CPT®) Hc Ot Self Care/Mgmt/Train Ea 15 Min 11 1    Total  23 2        Therapy Charges for Today     Code Description Service Date Service Provider Modifiers Qty    07483826618 HC OT THERAPEUTIC ACT EA 15 MIN 7/4/2018 Maria De Jesus Witt, OT GO 1    00059954512 HC OT SELF CARE/MGMT/TRAIN EA 15 MIN 7/4/2018 Maria De Jesus Witt, OT GO 1               Maria De Jesus Witt, OT  7/4/2018

## 2018-07-04 NOTE — PLAN OF CARE
Problem: Patient Care Overview  Goal: Plan of Care Review  Outcome: Ongoing (interventions implemented as appropriate)   07/04/18 1422   Coping/Psychosocial   Plan of Care Reviewed With patient   OTHER   Outcome Summary Pt confused but cooperative.Improved command following this date. Continue OT per POC

## 2018-07-04 NOTE — NURSING NOTE
Pt remains immobile in bed with high level of moisture and edema per RN. FRAN bed ordered from [facility] due to immobility, moisture, high skin risk.

## 2018-07-04 NOTE — THERAPY TREATMENT NOTE
Acute Care - Physical Therapy Treatment Note  [facility]     Patient Name: Perla Ivan  : 1929  MRN: [id number]  Today's Date: 2018     Date of Referral to PT: 18  Referring Physician: [id number]    Admit Date: 2018    Visit Dx:    ICD-10-CM ICD-9-CM   1. Chronic congestive heart failure, unspecified congestive heart failure type (CMS/HCC) I50.9 428.0   2. Declining functional status R53.81 799.3   3. Generalized weakness R53.1 780.79   4. Hypoxemia requiring supplemental oxygen R09.02 799.02    Z99.81    5. History of atrial fibrillation Z86.79 V12.59   6. Impaired functional mobility, balance, gait, and endurance Z74.09 V49.89   7. Impaired mobility and ADLs Z74.09 799.89     Patient Active Problem List   Diagnosis   • Acute on chronic congestive heart failure (CMS/HCC)   • Weakness   • Fall   • Atrial fibrillation, chronic (CMS/HCC)   • Dementia   • Essential hypertension   • Hyperlipidemia   • Hypothyroid   • Pneumonia        Therapy Treatment          Rehabilitation Treatment Summary     Row Name 18 1301             Treatment Time/Intention    Discipline physical therapist  -KM      Document Type therapy note (daily note)  -KM      Subjective Information no complaints   resting with eyes closed, arousable and alert  -KM      Mode of Treatment physical therapy  -KM      Therapy Frequency (PT Clinical Impression) daily  -KM      Patient Effort good  -KM      Existing Precautions/Restrictions fall;oxygen therapy device and L/min   dementia, pure wick catheter  -KM      Recorded by [KM] Michelle Carroll, PT 18 1335      Row Name 18 1301             Cognitive Assessment/Intervention- PT/OT    Affect/Mental Status (Cognitive) confused  -KM      Orientation Status (Cognition) oriented to;person  -KM      Follows Commands (Cognition) follows one step commands;75-90% accuracy;delayed response/completion;initiation impaired;verbal cues/prompting required  -KM      Cognitive  Function (Cognitive) safety deficit  -KM      Personal Safety Interventions fall prevention program maintained  -KM      Recorded by [KM] Michelle Carroll, PT 07/04/18 1335      Row Name 07/04/18 1301             Safety Issues, Functional Mobility    Impairments Affecting Function (Mobility) balance;cognition  -KM      Recorded by [KM] Michelle Carroll, PT 07/04/18 1335      Row Name 07/04/18 1301             Bed Mobility Assessment/Treatment    Bed Mobility Assessment/Treatment supine-sit;scooting/bridging  -KM      Rolling Left [place] (Bed Mobility) verbal cues;moderate assist (50% patient effort)  -KM      Scooting/Bridging [place] (Bed Mobility) verbal cues;moderate assist (50% patient effort)  -KM      Supine-Sit [place] (Bed Mobility) verbal cues;moderate assist (50% patient effort)  -KM      Bed Mobility, Safety Issues cognitive deficits limit understanding;decreased use of arms for pushing/pulling;decreased use of legs for bridging/pushing;impaired trunk control for bed mobility  -KM      Assistive Device (Bed Mobility) bed rails;draw sheet;head of bed elevated  -KM      Comment (Bed Mobility) --   dyspnea with exertion  -KM      Recorded by [KM] Michelle Carroll, PT 07/04/18 1335      Row Name 07/04/18 1301             Transfer Assessment/Treatment    Transfer Assessment/Treatment sit-stand transfer  -KM      Recorded by [KM] Michelle Carroll, PT 07/04/18 1335      Row Name 07/04/18 1301             Sit-Stand Transfer    Sit-Stand [place] (Transfers) moderate assist (50% patient effort);2 person assist  -KM      Recorded by [KM] Michelle Carroll, PT 07/04/18 1335      Row Name 07/04/18 1301             Stand-Sit Transfer    Stand-Sit [place] (Transfers) moderate assist (50% patient effort);2 person assist  -KM      Recorded by [KM] Michelle Carroll, PT 07/04/18 1335      Row Name 07/04/18 1301             Gait/Stairs Assessment/Training    Gait/Stairs  Assessment/Training --   bed to chair with 3-4 sidesteps mod a x 2  -KM      Recorded by [KM] Michelle Carroll, PT 07/04/18 1335      Row Name 07/04/18 1301             Motor Skills Assessment/Interventions    Additional Documentation Therapeutic Exercise (Group)  -KM      Recorded by [KM] Michelle Carroll, PT 07/04/18 1335      Row Name 07/04/18 1301             Therapeutic Exercise    92432 - PT Therapeutic Exercise Minutes 11  -KM      23851 - PT Therapeutic Activity Minutes 12  -KM      Recorded by [KM] Michelle Carroll, PT 07/04/18 1335      Row Name 07/04/18 1301             Therapeutic Exercise    Upper Extremity Range of Motion (Therapeutic Exercise) shoulder flexion/extension, bilateral;shoulder abduction/adduction, bilateral;elbow flexion/extension, bilateral  -KM      Lower Extremity (Therapeutic Exercise) gastroc stretch, bilateral;heel slides, bilateral;LAQ (long arc quad), bilateral;marching while seated;SLR (straight leg raise), bilateral  -KM      Lower Extremity Range of Motion (Therapeutic Exercise) ankle dorsiflexion/plantar flexion, bilateral  -KM      Exercise Type (Therapeutic Exercise) AAROM (active assistive range of motion)  -KM      Position (Therapeutic Exercise) seated  -KM      Sets/Reps (Therapeutic Exercise) 1/10  -KM      Recorded by [KM] Michelle Carroll, PT 07/04/18 1335      Row Name 07/04/18 1301             Static Sitting Balance    Level of [place] (Unsupported Sitting, Static Balance) contact guard assist  -KM      Sitting Position (Unsupported Sitting, Static Balance) sitting on edge of bed  -KM      Time Able to Maintain Position (Unsupported Sitting, Static Balance) 3 to 4 minutes  -KM      Recorded by [KM] Michelle Carroll, PT 07/04/18 1335      Row Name 07/04/18 1301             Static Standing Balance    Level of [place] (Supported Standing, Static Balance) moderate assist, 50 to 74% patient effort  -KM      Recorded by [KM] Michelle COHEN  Glen, PT 07/04/18 1335      Row Name 07/04/18 1301             Positioning and Restraints    Pre-Treatment Position in bed  -KM      Post Treatment Position chair  -KM      In Chair reclined;call light within reach;encouraged to call for assist;exit alarm on  -KM      Recorded by [KM] Michelle Carroll, PT 07/04/18 1335      Row Name 07/04/18 1301             Pain Assessment    Additional Documentation Pain Scale: Numbers Pre/Post-Treatment (Group)  -KM      Recorded by [KM] Michelle Carroll, PT 07/04/18 1335      Row Name 07/04/18 1301             Pain Scale: Numbers Pre/Post-Treatment    Pain Scale: Numbers, Pretreatment 0/10 - no pain  -KM      Pain Scale: Numbers, Post-Treatment 0/10 - no pain  -KM      Recorded by [KM] Michelle Carroll, PT 07/04/18 1335      Row Name 07/04/18 1301             Plan of Care Review    Plan of Care Reviewed With patient  -KM      Recorded by [KM] Michelle Carroll, PT 07/04/18 1335        User Key  (r) = Recorded By, (t) = Taken By, (c) = Cosigned By    Initials Name Effective Dates Discipline    KM Michelle Carroll, PT 06/19/15 -  PT                     Physical Therapy Education     Title: PT OT SLP Therapies (Active)     Topic: Physical Therapy (Active)     Point: Mobility training (Active)    Learning Progress Summary     Learner Status Readiness Method Response Comment Documented by    Patient Active Acceptance E NR  KM 07/04/18 1337     Active Acceptance E,D NR  CM 07/03/18 0955          Point: Home exercise program (Active)    Learning Progress Summary     Learner Status Readiness Method Response Comment Documented by    Patient Active Acceptance E NR  KM 07/04/18 1337     Active Acceptance E,D NR  CM 07/03/18 0955          Point: Body mechanics (Active)    Learning Progress Summary     Learner Status Readiness Method Response Comment Documented by    Patient Active Acceptance E NR  KM 07/04/18 1337     Active Acceptance E,D NR  CM 07/03/18 0955           Point: Precautions (Active)    Learning Progress Summary     Learner Status Readiness Method Response Comment Documented by    Patient Active Acceptance E NR   07/04/18 1337     Active Acceptance E,D NR   07/03/18 0955                      User Key     Initials Effective Dates Name Provider Type Discipline     06/19/15 -  Michelle Carroll, PT Physical Therapist PT     06/07/18 -  Lilia Kendrick, PT Student PT Student PT                    PT Recommendation and Plan  Therapy Frequency (PT Clinical Impression): daily  Plan of Care Reviewed With: patient  Outcome Summary: Pt alert and cooperative, able to participate with oob to chair with mod assist x 2, 3-4 side steps bed to chair. Progressing with plan of care on this date          Outcome Measures     Row Name 07/04/18 1301 07/03/18 1616 07/03/18 0955       How much help from another person do you currently need...    Turning from your back to your side while in flat bed without using bedrails? 2  -KM  -- 2  -KOLE (r) CM (t) KOLE (c)    Moving from lying on back to sitting on the side of a flat bed without bedrails? 2  -KM  -- 2  -KOLE (r) CM (t) KOLE (c)    Moving to and from a bed to a chair (including a wheelchair)? 2  -KM  -- 2  -KOLE (r) CM (t) KOLE (c)    Standing up from a chair using your arms (e.g., wheelchair, bedside chair)? 2  -KM  -- 2  -KOLE (r) CM (t) KOLE (c)    Climbing 3-5 steps with a railing? 1  -KM  -- 1  -KOLE (r) CM (t) KOLE (c)    To walk in hospital room? 2  -KM  -- 2  -KOLE (r) CM (t) KOLE (c)    AM-PAC 6 Clicks Score 11  -KM  -- 11  -KOLE (r) CM (t)       How much help from another is currently needed...    Putting on and taking off regular lower body clothing?  -- 1  -AR  --    Bathing (including washing, rinsing, and drying)  -- 1  -AR  --    Toileting (which includes using toilet bed pan or urinal)  -- 1  -AR  --    Putting on and taking off regular upper body clothing  -- 2  -AR  --    Taking care of personal grooming (such as brushing  teeth)  -- 2  -AR  --    Eating meals  -- 2  -AR  --    Score  -- 9  -AR  --       Functional Assessment    Outcome Measure Options AM-PAC 6 Clicks Basic Mobility (PT)  -KM AM-PAC 6 Clicks Daily Activity (OT)  -AR AM-PAC 6 Clicks Basic Mobility (PT)  -KOLE (r) CM (t) KOLE (c)      User Key  (r) = Recorded By, (t) = Taken By, (c) = Cosigned By    Initials Name Provider Type    KOLE Hurley, PT Physical Therapist    KM Michelle Carroll, PT Physical Therapist    AR Karley Silva, OT Occupational Therapist    CM Lilia Kendrick, PT Student PT Student           Time Calculation:         PT Charges     Row Name 07/04/18 1301             Time Calculation    Start Time 1301  -KM      PT Received On 07/04/18  -KM      PT Goal Re-Cert Due Date 07/13/18  -KM         Time Calculation- PT    Total Timed Code Minutes- PT 23 minute(s)  -KM         Timed Charges    53615 - PT Therapeutic Exercise Minutes 11  -KM      92354 - PT Therapeutic Activity Minutes 12  -KM        User Key  (r) = Recorded By, (t) = Taken By, (c) = Cosigned By    Initials Name Provider Type     Michelle Carroll, PT Physical Therapist        Therapy Suggested Charges     Code   Minutes Charges    81845 (CPT®) Hc Pt Neuromusc Re Education Ea 15 Min      80878 (CPT®) Hc Pt Ther Proc Ea 15 Min 11 1    54247 (CPT®) Hc Gait Training Ea 15 Min      98122 (CPT®) Hc Pt Therapeutic Act Ea 15 Min 12 1    63727 (CPT®) Hc Pt Manual Therapy Ea 15 Min      97536 (CPT®) Hc Pt Iontophoresis Ea 15 Min      56501 (CPT®) Hc Pt Elec Stim Ea-Per 15 Min      75436 (CPT®) Hc Pt Ultrasound Ea 15 Min      11348 (CPT®) Hc Pt Self Care/Mgmt/Train Ea 15 Min      Total  23 2        Therapy Charges for Today     Code Description Service Date Service Provider Modifiers Qty    43972891823 HC PT THER PROC EA 15 MIN 7/4/2018 Michelle Carroll, PT GP 1    22182804222 HC PT THERAPEUTIC ACT EA 15 MIN 7/4/2018 Michelle Carroll, PT GP 1    43191509783 HC PT THER SUPP  EA 15 MIN 7/4/2018 Michelle Carroll, PT GP 2          PT G-Codes  Outcome Measure Options: AM-PAC 6 Clicks Basic Mobility (PT)    Michelle Carroll, PT  7/4/2018

## 2018-07-04 NOTE — PLAN OF CARE
Problem: Patient Care Overview  Goal: Plan of Care Review  Outcome: Ongoing (interventions implemented as appropriate)    Goal: Individualization and Mutuality  Outcome: Ongoing (interventions implemented as appropriate)    Goal: Interprofessional Rounds/Family Conf  Outcome: Ongoing (interventions implemented as appropriate)      Problem: Skin Injury Risk (Adult)  Goal: Identify Related Risk Factors and Signs and Symptoms  Outcome: Ongoing (interventions implemented as appropriate)    Goal: Skin Health and Integrity  Outcome: Ongoing (interventions implemented as appropriate)      Problem: Fall Risk (Adult)  Goal: Identify Related Risk Factors and Signs and Symptoms  Outcome: Ongoing (interventions implemented as appropriate)    Goal: Absence of Fall  Outcome: Ongoing (interventions implemented as appropriate)      Problem: Palliative Care (Adult)  Goal: Identify Related Risk Factors and Signs and Symptoms  Outcome: Ongoing (interventions implemented as appropriate)    Goal: Maximized Comfort  Outcome: Ongoing (interventions implemented as appropriate)    Goal: Enhanced Quality of Life  Outcome: Ongoing (interventions implemented as appropriate)

## 2018-07-05 ENCOUNTER — APPOINTMENT (OUTPATIENT)
Dept: GENERAL RADIOLOGY | Facility: HOSPITAL | Age: 83
End: 2018-07-05

## 2018-07-05 LAB
ARTERIAL PATENCY WRIST A: ABNORMAL
ATMOSPHERIC PRESS: ABNORMAL MMHG
BACTERIA SPEC AEROBE CULT: NORMAL
BACTERIA SPEC AEROBE CULT: NORMAL
BASE EXCESS BLDA CALC-SCNC: 6.1 MMOL/L (ref 0–2)
BASOPHILS # BLD AUTO: 0.04 10*3/MM3 (ref 0–0.2)
BASOPHILS NFR BLD AUTO: 0.3 % (ref 0–1)
BDY SITE: ABNORMAL
CO2 BLDA-SCNC: 32.2 MMOL/L (ref 22–33)
COHGB MFR BLD: 0.7 % (ref 0–2)
DEPRECATED RDW RBC AUTO: 44.7 FL (ref 37–54)
EOSINOPHIL # BLD AUTO: 0.03 10*3/MM3 (ref 0–0.3)
EOSINOPHIL NFR BLD AUTO: 0.2 % (ref 0–3)
ERYTHROCYTE [DISTWIDTH] IN BLOOD BY AUTOMATED COUNT: 13.4 % (ref 11.3–14.5)
HCO3 BLDA-SCNC: 30.8 MMOL/L (ref 20–26)
HCT VFR BLD AUTO: 39.2 % (ref 34.5–44)
HCT VFR BLD CALC: 39 %
HGB BLD-MCNC: 12.6 G/DL (ref 11.5–15.5)
HGB BLDA-MCNC: 12.7 G/DL (ref 14–18)
HOROWITZ INDEX BLD+IHG-RTO: 44 %
IMM GRANULOCYTES # BLD: 0.12 10*3/MM3 (ref 0–0.03)
IMM GRANULOCYTES NFR BLD: 0.8 % (ref 0–0.6)
LYMPHOCYTES # BLD AUTO: 1.36 10*3/MM3 (ref 0.6–4.8)
LYMPHOCYTES NFR BLD AUTO: 9.5 % (ref 24–44)
MCH RBC QN AUTO: 29.6 PG (ref 27–31)
MCHC RBC AUTO-ENTMCNC: 32.1 G/DL (ref 32–36)
MCV RBC AUTO: 92 FL (ref 80–99)
METHGB BLD QL: 0.9 % (ref 0–1.5)
MODALITY: ABNORMAL
MONOCYTES # BLD AUTO: 1.84 10*3/MM3 (ref 0–1)
MONOCYTES NFR BLD AUTO: 12.9 % (ref 0–12)
NEUTROPHILS # BLD AUTO: 10.89 10*3/MM3 (ref 1.5–8.3)
NEUTROPHILS NFR BLD AUTO: 76.3 % (ref 41–71)
OXYHGB MFR BLDV: 91.2 % (ref 94–99)
PCO2 BLDA: 44.3 MM HG (ref 35–45)
PH BLDA: 7.45 PH UNITS (ref 7.35–7.45)
PLATELET # BLD AUTO: 312 10*3/MM3 (ref 150–450)
PMV BLD AUTO: 9.8 FL (ref 6–12)
PO2 BLDA: 65.6 MM HG (ref 83–108)
RBC # BLD AUTO: 4.26 10*6/MM3 (ref 3.89–5.14)
WBC NRBC COR # BLD: 14.28 10*3/MM3 (ref 3.5–10.8)

## 2018-07-05 PROCEDURE — 85025 COMPLETE CBC W/AUTO DIFF WBC: CPT | Performed by: INTERNAL MEDICINE

## 2018-07-05 PROCEDURE — 71045 X-RAY EXAM CHEST 1 VIEW: CPT

## 2018-07-05 PROCEDURE — 25010000002 LORAZEPAM PER 2 MG: Performed by: NURSE PRACTITIONER

## 2018-07-05 PROCEDURE — 94799 UNLISTED PULMONARY SVC/PX: CPT

## 2018-07-05 PROCEDURE — 25010000002 FUROSEMIDE PER 20 MG: Performed by: NURSE PRACTITIONER

## 2018-07-05 PROCEDURE — 36600 WITHDRAWAL OF ARTERIAL BLOOD: CPT | Performed by: NURSE PRACTITIONER

## 2018-07-05 PROCEDURE — 82805 BLOOD GASES W/O2 SATURATION: CPT | Performed by: NURSE PRACTITIONER

## 2018-07-05 PROCEDURE — 25010000002 PIPERACILLIN SOD-TAZOBACTAM PER 1 G: Performed by: NURSE PRACTITIONER

## 2018-07-05 PROCEDURE — 99233 SBSQ HOSP IP/OBS HIGH 50: CPT | Performed by: INTERNAL MEDICINE

## 2018-07-05 PROCEDURE — 94640 AIRWAY INHALATION TREATMENT: CPT

## 2018-07-05 PROCEDURE — 92610 EVALUATE SWALLOWING FUNCTION: CPT

## 2018-07-05 PROCEDURE — 94760 N-INVAS EAR/PLS OXIMETRY 1: CPT

## 2018-07-05 RX ORDER — LORAZEPAM 2 MG/ML
0.25 INJECTION INTRAMUSCULAR EVERY 4 HOURS PRN
Status: DISCONTINUED | OUTPATIENT
Start: 2018-07-05 | End: 2018-07-06 | Stop reason: HOSPADM

## 2018-07-05 RX ORDER — QUETIAPINE FUMARATE 25 MG/1
12.5 TABLET, FILM COATED ORAL EVERY 12 HOURS SCHEDULED
Status: DISCONTINUED | OUTPATIENT
Start: 2018-07-05 | End: 2018-07-06 | Stop reason: HOSPADM

## 2018-07-05 RX ORDER — ALPRAZOLAM 0.5 MG/1
0.5 TABLET ORAL 2 TIMES DAILY PRN
Status: DISCONTINUED | OUTPATIENT
Start: 2018-07-05 | End: 2018-07-05

## 2018-07-05 RX ORDER — HALOPERIDOL 5 MG/ML
1 INJECTION INTRAMUSCULAR EVERY 4 HOURS PRN
Status: DISCONTINUED | OUTPATIENT
Start: 2018-07-05 | End: 2018-07-06 | Stop reason: HOSPADM

## 2018-07-05 RX ADMIN — FUROSEMIDE 20 MG: 10 INJECTION, SOLUTION INTRAMUSCULAR; INTRAVENOUS at 18:01

## 2018-07-05 RX ADMIN — TAZOBACTAM SODIUM AND PIPERACILLIN SODIUM 4.5 G: 500; 4 INJECTION, SOLUTION INTRAVENOUS at 02:49

## 2018-07-05 RX ADMIN — IPRATROPIUM BROMIDE AND ALBUTEROL SULFATE 3 ML: 2.5; .5 SOLUTION RESPIRATORY (INHALATION) at 00:14

## 2018-07-05 RX ADMIN — FUROSEMIDE 20 MG: 10 INJECTION, SOLUTION INTRAMUSCULAR; INTRAVENOUS at 00:49

## 2018-07-05 RX ADMIN — LORAZEPAM 0.25 MG: 2 INJECTION INTRAMUSCULAR; INTRAVENOUS at 14:11

## 2018-07-05 RX ADMIN — TAZOBACTAM SODIUM AND PIPERACILLIN SODIUM 3.38 G: 375; 3 INJECTION, SOLUTION INTRAVENOUS at 16:08

## 2018-07-05 RX ADMIN — FUROSEMIDE 20 MG: 10 INJECTION, SOLUTION INTRAMUSCULAR; INTRAVENOUS at 07:17

## 2018-07-05 RX ADMIN — TAZOBACTAM SODIUM AND PIPERACILLIN SODIUM 3.38 G: 375; 3 INJECTION, SOLUTION INTRAVENOUS at 10:08

## 2018-07-05 RX ADMIN — IPRATROPIUM BROMIDE 0.5 MG: 0.5 SOLUTION RESPIRATORY (INHALATION) at 15:34

## 2018-07-05 RX ADMIN — IPRATROPIUM BROMIDE 0.5 MG: 0.5 SOLUTION RESPIRATORY (INHALATION) at 12:02

## 2018-07-05 RX ADMIN — IPRATROPIUM BROMIDE 0.5 MG: 0.5 SOLUTION RESPIRATORY (INHALATION) at 08:14

## 2018-07-05 NOTE — PROGRESS NOTES
Met with pt and family (dgt, son, and sister in law).  Family expressed clear understanding that pt is likely not going to get better given her advanced age, dementia, and recurrent UTIs over the past year.  She has been restless today and has not received any medication.  She has been pulling at covers, moving head and extremities excessively, rarely opening eyes.  She is not able to tolerate PO at this time.  Family is interested in pursuing a comfort focused plan of care with goal of minimizing symptoms during pt's dying process.  Discussed hospice mission, philosophy, and program.  That are amenable to accepting hospice.  At this point she would qualify for inpt hospice due to unmanaged anxiety, restlessness, agitation, possibly hyperactive delirium often seen near end of life.  She is not eating or drinking and remains confused/non communicative.  Fam resides in [facility] and are inquiring about transferring closer to home.  Referral made to [facility] and anticipate their liaison will be here today/tomorrow to meet w/ pt/fam.    Ordered Ativan 0.5mg q4hrs PRN IV as pt no longer able to take PO and has unmanaged restlessness.

## 2018-07-05 NOTE — PROGRESS NOTES
Continued Stay Note  [facility]     Patient Name: Perla Ivan  MRN: [id number]  Today's Date: 7/5/2018    Admit Date: 7/2/2018          Discharge Plan     Row Name 07/05/18 1404       Plan    Plan ONGOING    Patient/Family in Agreement with Plan yes    Plan Comments Met with family today to f/u DCP.  Noted referral to [facility] in [place].  CM will follow from periphery.  Call for any CM needs.  [phone].                Discharge Codes    No documentation.           Sirena Orozco

## 2018-07-05 NOTE — SIGNIFICANT NOTE
[facility] Medicine Services  CRITICAL CARE NOTE    Patient Name: Perla Ivan  : 1929  MRN: [id number]    Date of Admission: 2018  Length of Stay: 3    Subjective     Event/HPI:  Called by nursing staff secondary to patient being in respiratory distress.  Patient was restless and combative earlier today and received xanax.  Tonight she continued to be restless and combative and received seroquel 25 mg po x 1 dose.  A couple of hours later RN called stating that the patient was having difficulty breathing and snoring.  Upon evaluation patient was still restless, but would open her eyes spontaneously and to voice.  When she was resting she did have some snoring but respirations were unlabored.  Patient was still confused and would not answer any questions.  Stat chest xray and abg ordered.    Objective     Vital Signs:   Temp:  [96.7 °F (35.9 °C)-97.8 °F (36.6 °C)] 97.8 °F (36.6 °C)  Heart Rate:  [68-93] 69  Resp:  [16-21] 18  BP: (119-147)/(56-87) 147/82       Pertinent Physical Exam:  Neuro:  Opens eyes spontaneously and to noxious stimuli, does not follow commands, does move all extremities  Cardiac:  S1S2 RRR, no murmur  Lungs:  Wheezing in all lobes, decreased breath sounds in the RLL, unlabored  Abdomen:  +BS, SNTND  Skin:  Warm and dry, no rash     Results Reviewed:  I have personally reviewed current lab, radiology, and data and agree.      Results from last 7 days  Lab Units 18  1444 18  1605   WBC 10*3/mm3 13.83* 13.29* 13.77*   HEMOGLOBIN g/dL 12.1 11.9 11.6*   HEMATOCRIT % 38.8 36.6 35.3*   PLATELETS 10*3/mm3 259 282 243       Results from last 7 days  Lab Units 18  0603 18  1444 18  1605   SODIUM mmol/L 139  --  138 140 136*   POTASSIUM mmol/L 3.8 3.9 3.2* 3.6 4.5   CHLORIDE mmol/L 101  --  99 103 102   CO2 mmol/L 28.0  --  29.0 28.0 28.0   BUN mg/dL 18  --  8* 11 15   CREATININE mg/dL 0.64   --  0.72 0.74 0.70   GLUCOSE mg/dL 137*  --  135* 127* 124*   CALCIUM mg/dL 8.6*  --  9.0 9.6 9.0   ALT (SGPT) U/L  --   --   --  10 21   AST (SGOT) U/L  --   --   --  17 23   TROPONIN I ng/mL  --   --   --   --  <0.012     BNP   Date Value Ref Range Status   07/02/2018 394.0 (H) 0.0 - 100.0 pg/mL Final     Comment:     Results may be falsely decreased if patient taking Biotin.     pH, Arterial   Date Value Ref Range Status   07/05/2018 7.451 (H) 7.350 - 7.450 pH units Final       Microbiology Results Abnormal     Procedure Component Value - Date/Time    Blood Culture - Blood, [[id number]]  (Normal) Collected:  07/02/18 2022    Lab Status:  Preliminary result Specimen:  Blood from Arm, Left Updated:  07/04/18 2030     Blood Culture No growth at 2 days          Imaging Results (last 24 hours)     Procedure Component Value Units Date/Time    XR Chest 1 View [[id number]] Collected:  07/05/18 0010     Updated:  07/05/18 0040    Narrative:       EXAM:    XR Chest, 1 View    CLINICAL HISTORY:    88 years old, female; Heart failure, unspecified; Hypoxemia; Weakness; Other   malaise; Other reduced mobility; Other reduced mobility; Personal history of   other diseases of the circulatory system; Dependence on supplemental oxygen;   Signs and symptoms; Other: Decrease in breath sounds; Patient HX: Decrease in   breath sounds, combative behavior HX: Chf, atrial fibrillation, dementia, HTN,   pneumonia; Additional info: Decreased breath sounds    TECHNIQUE:    Frontal view of the chest.    COMPARISON:    CR - XR CHEST 1 VW 2018-07-04 03:23    FINDINGS:    Lungs:  Pulmonary vascular congestion with hilar haziness and scattered   patchy foci of hazy opacity in the right lung and medially in the left upper   lung field, slightly increased on the right.    Pleural space:  Unremarkable.  No pneumothorax.    Heart:  Mildly enlarged.    Mediastinum:  Unremarkable.    Bones/joints:  Unremarkable.      Impression:         Increased hazy  opacity in the right lung field may represent pneumonia.    THIS DOCUMENT HAS BEEN ELECTRONICALLY SIGNED BY SHAYLA MCGILL MD    XR Chest 1 View [[id number]] Collected:  07/04/18 1622     Updated:  07/04/18 1627    Narrative:       EXAMINATION: XR CHEST 1 VW - 7/4/2018     INDICATION: Dyspnea on exertion.     COMPARISON: 7/2/2018.     FINDINGS: Portable chest reveals heart to be enlarged. Increased  pulmonary vascularity bilaterally. Mild increased markings identified  lung bases. Small bilateral pleural effusions. Degenerative changes seen  within the spine.           Impression:       Heart is enlarged with increased pulmonary vascularity  bilaterally. Degenerative changes identified within the spine. Findings  have somewhat improved in the interval.     DICTATED:   7/4/2018  EDITED/ls :   7/4/2018            Results for orders placed during the hospital encounter of 07/02/18   Adult Transthoracic Echo Complete W/ Cont if Necessary Per Protocol    Narrative · Left ventricular wall thickness is consistent with mild concentric   hypertrophy.  · Mild mitral valve regurgitation is present.  · Mild pulmonic valve regurgitation is present.  · Mild to moderate tricuspid valve regurgitation is present.  · Calculated right ventricular systolic pressure from tricuspid   regurgitation is 39 mmHg.  · Left ventricular systolic function is normal. Estimated EF = 75%.  · There is no evidence of pericardial effusion.  · Mild pulmonary hypertension is present.  · MAC is present.  · The aortic valve exhibits sclerosis.  · Normal right ventricular cavity size, wall thickness, systolic function   and septal motion noted.          I have reviewed the medications.    Assessment / Plan     Hospital Problem List     * (Principal)Acute on chronic congestive heart failure (CMS/HCC)    Weakness    Fall    Atrial fibrillation, chronic (CMS/HCC)    Dementia    Essential hypertension    Hyperlipidemia    Hypothyroid    Pneumonia            Pertinent Assessment & Plan:     1.  Acute hypoxic respiratory failure   -stat cxr and abg   -chest xray showed increased hazy opacity in the right lung.     -DC's azithromycin and rocephin   -started zosyn to cover for anerobes for possible aspiration    -BC continue to be negative   -stat duonebs   -duonebs scheduled and prn        Critical Care time spent in direct patient care:  35 minutes (excluding procedure time, if applicable) including high complexity decision making to assess and treat vital organ system failure in this individual who has impairment of one or more vital organ systems such that there is a high probability of imminent or life threatening deterioration in the patient’s condition. Failure to initiate the above interventions on an urgent basis would likely result in sudden, clinically significant or life threatening deterioration in the patient's condition.      Electronically signed by MERT Valentino, 07/05/18, 12:49 AM.

## 2018-07-05 NOTE — PLAN OF CARE
Problem: Patient Care Overview  Goal: Interprofessional Rounds/Family Conf  Outcome: Ongoing (interventions implemented as appropriate)  Palliative Team Meeting Attendance 1200:  Dr. Gaetano Hilton, Maya Boswell, MERT, Krysten Herring, [facility], Lissette Schilling, [facility], Sharon Holt, [facility] and Sindy Ortez, RN/CM Hospice   07/05/18 0830   Interdisciplinary Rounds/Family Conf   Summary Patient is sleeping, snoring, will open eyes but goes back to sleep, unable to wake up to eat, coughing secretion, nurse suctioning her, speech unable to do bedside swallow exam, patient was not able to follow commands or participate. Needs goals of care and code discussion. Will talk at team with Palliative Medical provider. Palliative Team continue to follow, support and work on goals and plan of care   Participants ;nursing;advanced practice nurse;pastoral care;social work/services;physician

## 2018-07-05 NOTE — PLAN OF CARE
Problem: Patient Care Overview  Goal: Plan of Care Review  Outcome: Unable to achieve outcome(s) by discharge Date Met: 07/05/18 07/05/18 1311   Coping/Psychosocial   Plan of Care Reviewed With son;sibling   OTHER   Outcome Summary Pt with declining medical status now with plans for Hospice, per nsg. P.T. discussed physical therapy w/pt's son and her sister who agreed that P.T. is no longer appropriate and are in agreement to discharge P.T. services at this time. Discharge P.T.

## 2018-07-05 NOTE — PLAN OF CARE
Problem: Patient Care Overview  Goal: Plan of Care Review  Outcome: Unable to achieve outcome(s) by discharge Date Met: 07/05/18 07/05/18 1510   Coping/Psychosocial   Plan of Care Reviewed With family   Plan of Care Review   Progress declining   OTHER   Outcome Summary Pt with declining medical status. Plans for hospice care. Family declined further therapy. OT sevices will be discharged at this time.

## 2018-07-05 NOTE — PROGRESS NOTES
[facility] Medicine Services  PROGRESS NOTE    Patient Name: Perla Ivan  : 1929  MRN: [id number]    Date of Admission: 2018  Length of Stay: 3  Primary Care Physician: Kar Munoz DO    Subjective   Subjective     CC: dyspnea    HPI:Patient was more agitated and combative overnight, became more dyspneic. This morning she is restless.    Review of Systems  UTO sec to mental status    Objective   Objective     Vital Signs:   Temp:  [96.7 °F (35.9 °C)-97.8 °F (36.6 °C)] 97.8 °F (36.6 °C)  Heart Rate:  [] 102  Resp:  [16-24] 24  BP: (131-147)/(79-84) 147/82        Physical Exam:  Constitutional: No acute distress, restless  HENT: NCAT, mucous membranes moist  Respiratory: Clear to auscultation bilaterally, respiratory effort normal   Cardiovascular: RRR, no murmurs, rubs, or gallops, palpable pedal pulses bilaterally  Gastrointestinal: Positive bowel sounds, soft, nontender, nondistended  Musculoskeletal: No bilateral ankle edema  Psychiatric: GRISEL  Neurologic:GRISEL   Skin: No rashes    Results Reviewed:  I have personally reviewed current lab, radiology, and data and agree.      Results from last 7 days  Lab Units 18  0618  0435 18  1444   WBC 10*3/mm3 14.28* 13.83* 13.29*   HEMOGLOBIN g/dL 12.6 12.1 11.9   HEMATOCRIT % 39.2 38.8 36.6   PLATELETS 10*3/mm3 312 259 282       Results from last 7 days  Lab Units 18  0603 18  0435 18  1444 18  1605   SODIUM mmol/L 139  --  138 140 136*   POTASSIUM mmol/L 3.8 3.9 3.2* 3.6 4.5   CHLORIDE mmol/L 101  --  99 103 102   CO2 mmol/L 28.0  --  29.0 28.0 28.0   BUN mg/dL 18  --  8* 11 15   CREATININE mg/dL 0.64  --  0.72 0.74 0.70   GLUCOSE mg/dL 137*  --  135* 127* 124*   CALCIUM mg/dL 8.6*  --  9.0 9.6 9.0   ALT (SGPT) U/L  --   --   --  10 21   AST (SGOT) U/L  --   --   --  17 23   TROPONIN I ng/mL  --   --   --   --  <0.012     Estimated Creatinine Clearance: 50.6 mL/min  (by C-G formula based on SCr of 0.64 mg/dL).  BNP   Date Value Ref Range Status   07/02/2018 394.0 (H) 0.0 - 100.0 pg/mL Final     Comment:     Results may be falsely decreased if patient taking Biotin.       Microbiology Results Abnormal     Procedure Component Value - Date/Time    Blood Culture - Blood, [[id number]]  (Normal) Collected:  07/02/18 2022    Lab Status:  Preliminary result Specimen:  Blood from Arm, Left Updated:  07/04/18 2030     Blood Culture No growth at 2 days          Imaging Results (last 24 hours)     Procedure Component Value Units Date/Time    XR Chest 1 View [[id number]] Collected:  07/04/18 1622     Updated:  07/05/18 0909    Narrative:       EXAMINATION: XR CHEST 1 VW - 7/4/2018     INDICATION: Dyspnea on exertion.     COMPARISON: 7/2/2018.     FINDINGS: Portable chest reveals heart to be enlarged. Increased  pulmonary vascularity bilaterally. Mild increased markings identified  lung bases. Small bilateral pleural effusions. Degenerative changes seen  within the spine.           Impression:       Heart is enlarged with increased pulmonary vascularity  bilaterally. Degenerative changes identified within the spine. Findings  have somewhat improved in the interval.     DICTATED:   7/4/2018  EDITED/ls :   7/4/2018      This report was finalized on 7/5/2018 9:07 AM by Dr. Hattie Kruse MD.       XR Chest 1 View [[id number]] Collected:  07/05/18 0010     Updated:  07/05/18 0040    Narrative:       EXAM:    XR Chest, 1 View    CLINICAL HISTORY:    88 years old, female; Heart failure, unspecified; Hypoxemia; Weakness; Other   malaise; Other reduced mobility; Other reduced mobility; Personal history of   other diseases of the circulatory system; Dependence on supplemental oxygen;   Signs and symptoms; Other: Decrease in breath sounds; Patient HX: Decrease in   breath sounds, combative behavior HX: Chf, atrial fibrillation, dementia, HTN,   pneumonia; Additional info: Decreased breath  sounds    TECHNIQUE:    Frontal view of the chest.    COMPARISON:    CR - XR CHEST 1 VW 2018-07-04 03:23    FINDINGS:    Lungs:  Pulmonary vascular congestion with hilar haziness and scattered   patchy foci of hazy opacity in the right lung and medially in the left upper   lung field, slightly increased on the right.    Pleural space:  Unremarkable.  No pneumothorax.    Heart:  Mildly enlarged.    Mediastinum:  Unremarkable.    Bones/joints:  Unremarkable.      Impression:         Increased hazy opacity in the right lung field may represent pneumonia.    THIS DOCUMENT HAS BEEN ELECTRONICALLY SIGNED BY SHAYLA MCGILL MD        Results for orders placed during the hospital encounter of 07/02/18   Adult Transthoracic Echo Complete W/ Cont if Necessary Per Protocol    Narrative · Left ventricular wall thickness is consistent with mild concentric   hypertrophy.  · Mild mitral valve regurgitation is present.  · Mild pulmonic valve regurgitation is present.  · Mild to moderate tricuspid valve regurgitation is present.  · Calculated right ventricular systolic pressure from tricuspid   regurgitation is 39 mmHg.  · Left ventricular systolic function is normal. Estimated EF = 75%.  · There is no evidence of pericardial effusion.  · Mild pulmonary hypertension is present.  · MAC is present.  · The aortic valve exhibits sclerosis.  · Normal right ventricular cavity size, wall thickness, systolic function   and septal motion noted.          I have reviewed the medications.    Assessment/Plan   Assessment / Plan     Hospital Problem List     * (Principal)Acute on chronic congestive heart failure (CMS/HCC)    Weakness    Fall    Atrial fibrillation, chronic (CMS/HCC)    Dementia    Essential hypertension    Hyperlipidemia    Hypothyroid    Pneumonia            Brief Hospital Course to date:Perla Ivan is a 88 y.o. female with hx of dementia, hypertension, Afib who presented with dyspnea likely sec to A/C chf and  pna.     Assessment & Plan:  - Acute respiratory failure with hypoxia etiology likely multifactorial sec to a/c chf exacerbation and pneumonia, repeat cxr worsening for RML opacities, continue abx now on zosyn monotherapy, duonebs, steroids, continue to diurese as tolerated, f/u echo  - Acute encephalopathy in the setting of advanced dementia and delirium. Schedule seroquel 12.5 mg qhs  - Afib continue pradaxa  - Continue home rx for anxiety, hypothyroidism  - hx of weakness with falls, PT/OT following  - Prognosis is guarded, considering advanced dementia, lethargy, weakness and falls decreased functional status palliative consulted and following on GOC.Discussed with Daughter Vida, patient is now a DNR, their GOC will likely be comfort, she will discuss with her siblings     DVT Prophylaxis:  On Pradaxa     CODE STATUS:   Code Status and Medical Interventions:   Ordered at: 07/05/18 0958     Level Of Support Discussed With:    Health Care Surrogate     Code Status:    No CPR     Medical Interventions (Level of Support Prior to Arrest):    Full     Disposition: TBD, anticipate placement to rehab, CM following    Electronically signed by Raulito Stratton MD, 07/05/18, 9:59 AM.

## 2018-07-05 NOTE — THERAPY DISCHARGE NOTE
Acute Care - Occupational Therapy Discharge Summary  [facility]     Patient Name: Perla Ivan  : 1929  MRN: [id number]    Today's Date: 2018       Date of Referral to OT: 18  Referring Physician: [id number]      Admit Date: 2018        OT Recommendation and Plan    Visit Dx:    ICD-10-CM ICD-9-CM   1. Chronic congestive heart failure, unspecified congestive heart failure type (CMS/HCC) I50.9 428.0   2. Declining functional status R53.81 799.3   3. Generalized weakness R53.1 780.79   4. Hypoxemia requiring supplemental oxygen R09.02 799.02    Z99.81    5. History of atrial fibrillation Z86.79 V12.59   6. Impaired functional mobility, balance, gait, and endurance Z74.09 V49.89   7. Impaired mobility and ADLs Z74.09 799.89               Time Calculation- OT     Row Name 18 0820             Time Calculation- OT    OT Start Time 0820  -      OT Received On 18  -      OT Goal Re-Cert Due Date 18  -        User Key  (r) = Recorded By, (t) = Taken By, (c) = Cosigned By    Initials Name Provider Type    TATYANA Hernandes OT Occupational Therapist                      Outcome Measures     Row Name 18 1355 18 1301 18 1616       How much help from another person do you currently need...    Turning from your back to your side while in flat bed without using bedrails?  -- 2  -KM  --    Moving from lying on back to sitting on the side of a flat bed without bedrails?  -- 2  -KM  --    Moving to and from a bed to a chair (including a wheelchair)?  -- 2  -KM  --    Standing up from a chair using your arms (e.g., wheelchair, bedside chair)?  -- 2  -KM  --    Climbing 3-5 steps with a railing?  -- 1  -KM  --    To walk in hospital room?  -- 2  -KM  --    AM-PAC 6 Clicks Score  -- 11  -KM  --       How much help from another is currently needed...    Putting on and taking off regular lower body clothing? 1  -JR  -- 1  -AR    Bathing (including washing, rinsing, and drying)  1  -JR  -- 1  -AR    Toileting (which includes using toilet bed pan or urinal) 1  -JR  -- 1  -AR    Putting on and taking off regular upper body clothing 2  -JR  -- 2  -AR    Taking care of personal grooming (such as brushing teeth) 3  -JR  -- 2  -AR    Eating meals 2  -JR  -- 2  -AR    Score 10  -JR  -- 9  -AR       Functional Assessment    Outcome Measure Options AM-PAC 6 Clicks Daily Activity (OT)  -JR AM-PAC 6 Clicks Basic Mobility (PT)  -KM AM-PAC 6 Clicks Daily Activity (OT)  -AR    Row Name 07/03/18 0955             How much help from another person do you currently need...    Turning from your back to your side while in flat bed without using bedrails? 2  -KOLE (r) CM (t) KOLE (c)      Moving from lying on back to sitting on the side of a flat bed without bedrails? 2  -KOLE (r) CM (t) KOLE (c)      Moving to and from a bed to a chair (including a wheelchair)? 2  -KOLE (r) CM (t) KOLE (c)      Standing up from a chair using your arms (e.g., wheelchair, bedside chair)? 2  -KOLE (r) CM (t) KOLE (c)      Climbing 3-5 steps with a railing? 1  -KOLE (r) CM (t) KOLE (c)      To walk in hospital room? 2  -KOLE (r) CM (t) KOLE (c)      AM-PAC 6 Clicks Score 11  -KOLE (r) CM (t)         Functional Assessment    Outcome Measure Options AM-PAC 6 Clicks Basic Mobility (PT)  -KOLE (r) CM (t) KOLE (c)        User Key  (r) = Recorded By, (t) = Taken By, (c) = Cosigned By    Initials Name Provider Type    KOLE Carolyn Hurley, PT Physical Therapist    KM Michelle Carroll, PT Physical Therapist    JR Maria De Jesus Witt, OT Occupational Therapist    AR Karley Silva, OT Occupational Therapist    CM Lilia Kendrick, PT Student PT Student          Therapy Suggested Charges     Code   Minutes Charges    95649 (CPT®) Hc Ot Neuromusc Re Education Ea 15 Min      55234 (CPT®) Hc Ot Ther Proc Ea 15 Min      24333 (CPT®) Hc Gait Training Ea 15 Min      76191 (CPT®) Hc Ot Therapeutic Act Ea 15 Min 12 1    86403 (CPT®) Hc Ot Manual Therapy Ea 15 Min       45968 (CPT®) Hc Ot Iontophoresis Ea 15 Min      04911 (CPT®) Hc Ot Elec Stim Ea-Per 15 Min      94420 (CPT®) Hc Ot Ultrasound Ea 15 Min      64656 (CPT®) Hc Ot Self Care/Mgmt/Train Ea 15 Min 11 1    Total  23 2              OT Discharge Summary  Anticipated Discharge Disposition (OT):  (hospice)  Reason for Discharge: Change in medical status  Outcomes Achieved: Unable to make functional progress toward goals at this time  Discharge Destination:  (hospice)      Luiza Hernandes OT  7/5/2018

## 2018-07-05 NOTE — PLAN OF CARE
Problem: Patient Care Overview  Goal: Plan of Care Review  Outcome: Ongoing (interventions implemented as appropriate)    Goal: Individualization and Mutuality  Outcome: Ongoing (interventions implemented as appropriate)    Goal: Discharge Needs Assessment  Outcome: Ongoing (interventions implemented as appropriate)    Goal: Interprofessional Rounds/Family Conf  Outcome: Ongoing (interventions implemented as appropriate)      Problem: Skin Injury Risk (Adult)  Goal: Identify Related Risk Factors and Signs and Symptoms  Outcome: Ongoing (interventions implemented as appropriate)    Goal: Skin Health and Integrity  Outcome: Ongoing (interventions implemented as appropriate)      Problem: Fall Risk (Adult)  Goal: Identify Related Risk Factors and Signs and Symptoms  Outcome: Ongoing (interventions implemented as appropriate)    Goal: Absence of Fall  Outcome: Ongoing (interventions implemented as appropriate)      Problem: Palliative Care (Adult)  Goal: Identify Related Risk Factors and Signs and Symptoms  Outcome: Ongoing (interventions implemented as appropriate)    Goal: Maximized Comfort  Outcome: Ongoing (interventions implemented as appropriate)    Goal: Enhanced Quality of Life  Outcome: Ongoing (interventions implemented as appropriate)

## 2018-07-05 NOTE — THERAPY RE-EVALUATION
Acute Care - Speech Language Pathology   Swallow Re-Evaluation [facility]   Clinical Swallow Evaluation       Patient Name: Perla Ivan  : 1929  MRN: [id number]  Today's Date: 2018        Referring Physician: [id number]      Admit Date: 2018    Visit Dx:     ICD-10-CM ICD-9-CM   1. Chronic congestive heart failure, unspecified congestive heart failure type (CMS/HCC) I50.9 428.0   2. Declining functional status R53.81 799.3   3. Generalized weakness R53.1 780.79   4. Hypoxemia requiring supplemental oxygen R09.02 799.02    Z99.81    5. History of atrial fibrillation Z86.79 V12.59   6. Impaired functional mobility, balance, gait, and endurance Z74.09 V49.89   7. Impaired mobility and ADLs Z74.09 799.89     Patient Active Problem List   Diagnosis   • Acute on chronic congestive heart failure (CMS/HCC)   • Weakness   • Fall   • Atrial fibrillation, chronic (CMS/HCC)   • Dementia   • Essential hypertension   • Hyperlipidemia   • Hypothyroid   • Pneumonia      Past Medical History:   Diagnosis Date   • Atrial fibrillation (CMS/HCC)    • Atrial fibrillation (CMS/HCC)    • Dementia    • Disease of thyroid gland    • Hyperlipidemia    • Hypertension    • Vertigo      Past Surgical History:   Procedure Laterality Date   • BREAST SURGERY     • CYST REMOVAL      L breat and L ovary          SWALLOW EVALUATION (last 72 hours)      SLP Adult Swallow Evaluation     Row Name 18 0900 18 1300 18 0945             Rehab Evaluation    Document Type  -- re-evaluation  -AV (r) LW (t) AV (c) evaluation  -AV (r) LW (t) AV (c)      Subjective Information  -- no complaints  -AV (r) LW (t) AV (c) complains of;pain  -AV (r) LW (t) AV (c)      Patient Observations  -- cooperative;agree to therapy;lethargic  -AV (r) LW (t) AV (c) poorly cooperative;lethargic  -AV (r) LW (t) AV (c)      Patient/Family Observations  -- son present, pt was in and out of wakefullness, easily awakened and responds to commands  -AV  (r) LW (t) AV (c) pt largely unintelligible 2' cog, dec'd breath support. Son present, not able to receive much hx from him, he said to wait for his sister  -AV (r) LW (t) AV (c)      Patient Effort  -- good  -AV (r) LW (t) AV (c) fair  -AV (r) LW (t) AV (c)      Comment  --  -- pt lethargic, reduced cog status  -AV (r) LW (t) AV (c)         General Information    Patient Profile Reviewed  -- yes  -AV (r) LW (t) AV (c) yes  -AV (r) LW (t) AV (c)      Pertinent History Of Current Problem  -- Adm chronic congestive heart failure, possible Pna?, Hx dementia   -AV (r) LW (t) AV (c) Adm chronic congestive heart failure, possible Pna?, Hx dementia   -AV (r) LW (t) AV (c)      Current Method of Nutrition (P)  soft textures;chopped;thin liquids  -LW regular textures;thin liquids  -AV (r) LW (t) AV (c) regular textures;thin liquids  -AV (r) LW (t) AV (c)      Precautions/Limitations, Vision  -- WFL;for purposes of eval  -AV (r) LW (t) AV (c) WFL;for purposes of eval  -AV (r) LW (t) AV (c)      Precautions/Limitations, Hearing  -- WFL  -AV (r) LW (t) AV (c) WFL  -AV (r) LW (t) AV (c)      Prior Level of Function-Communication (P)  cognitive-linguistic impairment   baseline  -LW cognitive-linguistic impairment   baseline dementia  -AV (r) LW (t) AV (c) cognitive-linguistic impairment   baseline dementia  -AV (r) LW (t) AV (c)      Prior Level of Function-Swallowing (P)  no diet consistency restrictions  -LW no diet consistency restrictions  -AV (r) LW (t) AV (c) no diet consistency restrictions  -AV (r) LW (t) AV (c)      Plans/Goals Discussed with  -- patient;family  -AV (r) LW (t) AV (c) patient;family  -AV (r) LW (t) AV (c)      Barriers to Rehab  -- medically complex;cognitive status  -AV (r) LW (t) AV (c) medically complex;cognitive status  -AV (r) LW (t) AV (c)      Patient's Goals for Discharge  -- patient did not state  -AV (r) LW (t) AV (c) patient did not state  -AV (r) LW (t) AV (c)      Family Goals for Discharge   -- family did not state  -AV (r) LW (t) AV (c)  --         Pain Assessment    Additional Documentation  --  -- Pain Scale: FACES Pre/Post-Treatment (Group)  -AV (r) LW (t) AV (c)         Pain Scale: FACES Pre/Post-Treatment    Pain: FACES Scale, Pretreatment  --  -- 6-->hurts even more  -AV (r) LW (t) AV (c)      Pain: FACES Scale, Post-Treatment  --  -- 6-->hurts even more  -AV (r) LW (t) AV (c)      Pre/Post Treatment Pain Comment  --  -- feet and legs  -AV (r) LW (t) AV (c)         Oral Motor and Function    Dentition Assessment  -- upper dentures/partial in place  -AV (r) LW (t) AV (c) upper dentures/partial in place  -AV (r) LW (t) AV (c)      Secretion Management  -- WNL/WFL  -AV (r) LW (t) AV (c) WNL/WFL  -AV (r) LW (t) AV (c)      Mucosal Quality  -- moist, healthy  -AV (r) LW (t) AV (c) moist, healthy  -AV (r) LW (t) AV (c)      Volitional Swallow  -- weak  -AV (r) LW (t) AV (c) weak  -AV (r) LW (t) AV (c)      Volitional Cough  -- weak;reduced respiratory support  -AV (r) LW (t) AV (c) weak;reduced respiratory support  -AV (r) LW (t) AV (c)         Oral Musculature and Cranial Nerve Assessment    Oral Motor General Assessment  -- WFL  -AV (r) LW (t) AV (c) unable to assess  -AV (r) LW (t) AV (c)         General Eating/Swallowing Observations    Respiratory Support Currently in Use (P)  nasal cannula  -LW nasal cannula  -AV (r) LW (t) AV (c) nasal cannula  -AV (r) LW (t) AV (c)      Eating/Swallowing Skills  -- fed by SLP;self-fed;unsafe self-feeding skills observed  -AV (r) LW (t) AV (c) fed by SLP  -AV (r) LW (t) AV (c)      Positioning During Eating  -- upright in bed  -AV (r) LW (t) AV (c) upright in bed  -AV (r) LW (t) AV (c)      Utensils Used  -- cup  -AV (r) LW (t) AV (c) spoon;cup  -AV (r) LW (t) AV (c)      Consistencies Trialed  -- pureed;regular textures;thin liquids  -AV (r) LW (t) AV (c) pureed;thin liquids;nectar/syrup-thick liquids  -AV (r) LW (t) AV (c)         Clinical Swallow Eval    Oral  "Prep Phase  -- WFL  -AV (r) LW (t) AV (c) impaired  -AV (r) LW (t) AV (c)      Oral Transit  -- WFL  -AV (r) LW (t) AV (c)  --      Oral Residue  -- WFL  -AV (r) LW (t) AV (c)  --      Pharyngeal Phase  -- no overt signs/symptoms of pharyngeal impairment  -AV (r) LW (t) AV (c) suspected pharyngeal impairment   hard to assess 2' cog status  -AV (r) LW (t) AV (c)      Esophageal Phase  -- suspected esophageal impairment  -AV (r) LW (t) AV (c)  --      Clinical Swallow Evaluation Summary (P)  Pt seen at BS this am. RN reports no difficulty w/ meds but inc'd concern for asp d/t developing PNA. Pt was in and out of wakefulness, confused. CNA 2' cog/alertness level. REC: NPO, meds via alternate route if able, will f/u to gauge appropriateness for instrumental eval to r/o aspiration.   -LW Pt re-evaluated by BS this pm. Pt more alert and cooperative, still in and out of wakefulness. Easily aroused and followed commands w/ multiple repetitions. Pt exhibted no overt s/s of aspiration w/ any trial. Pt exhibited to \"snore\" multiple times during the eval, even when awake (suspect possible velopharyngeal incompetence) and burp repetitively during mastication and throughout eval. REC: soft, chopped, thin 2' cog status and ease per son. Meds w/ puree. Aspiration precautions. Oral Care BID and PRN.   -AV (r) LW (t) AV (c) Pt seen for BS this AM. Cog status made BS difficult to assess. Pt administerd thins (tsp and cup), nectar, and puree. Cough w/ tsp thins only. Multiple Swallows w/ cup thins only. No s/s w/ any other consistency. Pt made \"snorting sound\" throughout eval. Possible velopharyngeal incompetency. Will re-assess this pm in hope that cog status may improve. REC: diet as is, meds crushed w/ puree/pudding, aspiration precautions.  -AV (r) LW (t) AV (c)         Oral Prep Concerns    Oral Prep Concerns  --  -- anterior loss;incomplete or weak lip closure around spoon  -AV (r) LW (t) AV (c)      Incomplete or Weak Lip " Closure Around Spoon  --  -- thin  -AV (r) LW (t) AV (c)      Anterior Loss  --  -- thin  -AV (r) LW (t) AV (c)         Pharyngeal Phase Concerns    Pharyngeal Phase Concerns  --  -- cough;multiple swallows  -AV (r) LW (t) AV (c)      Multiple Swallows  --  -- thin   cup  -AV (r) LW (t) AV (c)      Cough  --  -- thin   tsp  -AV (r) LW (t) AV (c)         Esophageal Phase Concerns    Esophageal Phase Concerns  -- belching  -AV (r) LW (t) AV (c)  --      Belching  -- all consistencies  -AV (r) LW (t) AV (c)  --         Clinical Impression    SLP Swallowing Diagnosis (P)  suspected pharyngeal dysfunction  -LW functional oral phase;functional pharyngeal phase   still monitor for possible pharyngeal dysfunction  -AV (r) LW (t) AV (c) mild;oral dysfunction;suspected pharyngeal dysfunction  -AV (r) LW (t) AV (c)      Functional Impact (P)  risk of aspiration/pneumonia;risk of malnutrition;risk of dehydration  -LW risk of aspiration/pneumonia  -AV (r) LW (t) AV (c) risk of aspiration/pneumonia  -AV (r) LW (t) AV (c)      Rehab Potential/Prognosis, Swallowing (P)  good, to achieve stated therapy goals  -LW good, to achieve stated therapy goals  -AV (r) LW (t) AV (c) good, to achieve stated therapy goals  -AV (r) LW (t) AV (c)      Criteria for Skilled Therapeutic Interventions Met (P)  demonstrates skilled criteria  -LW demonstrates skilled criteria  -AV (r) LW (t) AV (c) demonstrates skilled criteria  -AV (r) LW (t) AV (c)         Recommendations    Therapy Frequency (Swallow) (P)  evaluation only  -LW evaluation only;PRN  -AV (r) LW (t) AV (c) evaluation only  -AV (r) LW (t) AV (c)      Predicted Duration Therapy Intervention (Days) (P)  until discharge  -LW until discharge  -AV (r) LW (t) AV (c) until discharge  -AV (r) LW (t) AV (c)      SLP Diet Recommendation (P)  NPO  -LW soft textures;chopped;thin liquids  -AV (r) LW (t) AV (c) regular textures;thin liquids  -AV (r) LW (t) AV (c)      Recommended Diagnostics (P)  FEES   -LW  -- reassess via clinical swallow evaluation  -AV (r) LW (t) AV (c)      Recommended Precautions and Strategies  -- upright posture during/after eating;small bites of food and sips of liquid  -AV (r) LW (t) AV (c) upright posture during/after eating;small bites of food and sips of liquid  -AV (r) LW (t) AV (c)      SLP Rec. for Method of Medication Administration (P)  meds via alternate route  -LW meds crushed;with pudding or applesauce  -AV (r) LW (t) AV (c) meds crushed;with pudding or applesauce  -AV (r) LW (t) AV (c)      Monitor for Signs of Aspiration  -- yes;cough;gurgly voice;throat clearing;pneumonia;notify SLP if any concerns  -AV (r) LW (t) AV (c) yes;cough;gurgly voice;throat clearing;pneumonia;notify SLP if any concerns  -AV (r) LW (t) AV (c)      Anticipated Dischage Disposition (P)  unknown  -LW unknown  -AV (r) LW (t) AV (c) unknown  -AV (r) LW (t) AV (c)         Swallow Goals (SLP)    Oral Nutrition/Hydration Goal Selection (SLP)  -- oral nutrition/hydration, SLP goal 1  -AV (r) LW (t) AV (c)  --      Swallow Management Recall Goal Selection (SLP)  -- swallow management recall, SLP goal 1  -AV (r) LW (t) AV (c)  --      Swallow Compensatory Strategies Goal Selection (SLP)  -- swallow compensatory strategies, SLP goal 1  -AV (r) LW (t) AV (c)  --      Additional Documentation  -- swallow compensatory strategies goal selection (SLP)  -AV (r) LW (t) AV (c)  --         Oral Nutrition/Hydration Goal 1 (SLP)    Oral Nutrition/Hydration Goal 1, SLP  -- LTG: Pt will tolerate diet of soft/chopped and thins w/o s/s of aspiration w/ 100% accuracy and minmal cueing.   -AV (r) LW (t) AV (c)  --      Time Frame (Oral Nutrition/Hydration Goal 1, SLP)  -- by discharge  -AV (r) LW (t) AV (c)  --      Progress/Outcomes (Oral Nutrition/Hydration Goal 1, SLP)  -- goal ongoing  -AV (r) LW (t) AV (c)  --         Swallow Compensatory Strategies Goal 1 (SLP)    Activity (Swallow Compensatory Strategies/Techniques Goal  1, SLP)  -- small bites;small cup sips;during meal intake  -AV (r) LW (t) AV (c)  --      [place]/Accuracy (Swallow Compensatory Strategies/Techniques Goal 1, SLP)  -- with minimal cues (75-90% accuracy)  -AV (r) LW (t) AV (c)  --      Time Frame (Swallow Compensatory Strategies/Techniques Goal 1, SLP)  -- short term goal (STG);by discharge  -AV (r) LW (t) AV (c)  --      Progress/Outcomes (Swallow Compensatory Strategies/Techniques Goal 1, SLP)  -- goal ongoing  -AV (r) LW (t) AV (c)  --        User Key  (r) = Recorded By, (t) = Taken By, (c) = Cosigned By    Initials Name Effective Dates    PATRICIO Luciano, MS CCC-SLP 04/03/18 -     SOMMER Dixon, Speech Therapy Student 05/14/18 -         EDUCATION  The patient has been educated in the following areas:   Dysphagia (Swallowing Impairment) NPO rationale.    SLP Recommendation and Plan  SLP Swallowing Diagnosis: (P) suspected pharyngeal dysfunction  SLP Diet Recommendation: (P) NPO           Recommended Diagnostics: (P) FEES  Criteria for Skilled Therapeutic Interventions Met: (P) demonstrates skilled criteria  Anticipated Dischage Disposition: (P) unknown  Rehab Potential/Prognosis, Swallowing: (P) good, to achieve stated therapy goals  Therapy Frequency (Swallow): (P) evaluation only  Predicted Duration Therapy Intervention (Days): (P) until discharge       Plan of Care Reviewed With: (P) patient  Plan of Care Review  Plan of Care Reviewed With: (P) patient  Progress: (P) no change          SLP GOALS     Row Name 07/03/18 1300             Oral Nutrition/Hydration Goal 1 (SLP)    Oral Nutrition/Hydration Goal 1, SLP LTG: Pt will tolerate diet of soft/chopped and thins w/o s/s of aspiration w/ 100% accuracy and minmal cueing.   -AV (r) LW (t) AV (c)      Time Frame (Oral Nutrition/Hydration Goal 1, SLP) by discharge  -AV (r) LW (t) AV (c)      Progress/Outcomes (Oral Nutrition/Hydration Goal 1, SLP) goal ongoing  -AV (r) LW (t) AV (c)         Swallow  Compensatory Strategies Goal 1 (SLP)    Activity (Swallow Compensatory Strategies/Techniques Goal 1, SLP) small bites;small cup sips;during meal intake  -AV (r) LW (t) AV (c)      [place]/Accuracy (Swallow Compensatory Strategies/Techniques Goal 1, SLP) with minimal cues (75-90% accuracy)  -AV (r) LW (t) AV (c)      Time Frame (Swallow Compensatory Strategies/Techniques Goal 1, SLP) short term goal (STG);by discharge  -AV (r) LW (t) AV (c)      Progress/Outcomes (Swallow Compensatory Strategies/Techniques Goal 1, SLP) goal ongoing  -AV (r) LW (t) AV (c)        User Key  (r) = Recorded By, (t) = Taken By, (c) = Cosigned By    Initials Name Provider Type    PATRICIO Luciano MS CCC-SLP Speech and Language Pathologist    SOMMER Dixon, Speech Therapy Student Speech Therapy Student               Time Calculation:         Time Calculation- SLP     Row Name 07/05/18 0900             Time Calculation- SLP    SLP Start Time (P)  0900  -LW      SLP Received On (P)  07/05/18  -        User Key  (r) = Recorded By, (t) = Taken By, (c) = Cosigned By    Initials Name Provider Type    SOMMER Dixon, Speech Therapy Student Speech Therapy Student          Therapy Charges for Today     Code Description Service Date Service Provider Modifiers Qty    10561521626 HC ST EVAL ORAL PHARYNG SWALLOW 3 7/5/2018 Katey Dixon, Speech Therapy Student GN 1               Katey Dixon Speech Therapy Student  7/5/2018

## 2018-07-05 NOTE — THERAPY DISCHARGE NOTE
Acute Care - IRF Physical Therapy Discharge Summary  [facility]       Patient Name: Perla Ivan  : 1929  MRN: [id number]    Today's Date: 2018       Date of Referral to PT: 18  Referring Physician: [id number]      Admit Date: 2018      PT Recommendation and Plan    Visit Dx:    ICD-10-CM ICD-9-CM   1. Chronic congestive heart failure, unspecified congestive heart failure type (CMS/HCC) I50.9 428.0   2. Declining functional status R53.81 799.3   3. Generalized weakness R53.1 780.79   4. Hypoxemia requiring supplemental oxygen R09.02 799.02    Z99.81    5. History of atrial fibrillation Z86.79 V12.59   6. Impaired functional mobility, balance, gait, and endurance Z74.09 V49.89   7. Impaired mobility and ADLs Z74.09 799.89             Outcome Measures     Row Name 18 1355 18 1301 18 1616       How much help from another person do you currently need...    Turning from your back to your side while in flat bed without using bedrails?  -- 2  -KM  --    Moving from lying on back to sitting on the side of a flat bed without bedrails?  -- 2  -KM  --    Moving to and from a bed to a chair (including a wheelchair)?  -- 2  -KM  --    Standing up from a chair using your arms (e.g., wheelchair, bedside chair)?  -- 2  -KM  --    Climbing 3-5 steps with a railing?  -- 1  -KM  --    To walk in hospital room?  -- 2  -KM  --    AM-PAC 6 Clicks Score  -- 11  -KM  --       How much help from another is currently needed...    Putting on and taking off regular lower body clothing? 1  -JR  -- 1  -AR    Bathing (including washing, rinsing, and drying) 1  -JR  -- 1  -AR    Toileting (which includes using toilet bed pan or urinal) 1  -JR  -- 1  -AR    Putting on and taking off regular upper body clothing 2  -JR  -- 2  -AR    Taking care of personal grooming (such as brushing teeth) 3  -JR  -- 2  -AR    Eating meals 2  -JR  -- 2  -AR    Score 10  -JR  -- 9  -AR       Functional Assessment    Outcome  Measure Options AM-PAC 6 Clicks Daily Activity (OT)  - AM-PAC 6 Clicks Basic Mobility (PT)  -KM AM-PAC 6 Clicks Daily Activity (OT)  -AR    Row Name 07/03/18 0955             How much help from another person do you currently need...    Turning from your back to your side while in flat bed without using bedrails? 2  -KOLE (r) CM (t) KOLE (c)      Moving from lying on back to sitting on the side of a flat bed without bedrails? 2  -KOLE (r) CM (t) KOLE (c)      Moving to and from a bed to a chair (including a wheelchair)? 2  -KOLE (r) CM (t) KOLE (c)      Standing up from a chair using your arms (e.g., wheelchair, bedside chair)? 2  -KOLE (r) CM (t) KOLE (c)      Climbing 3-5 steps with a railing? 1  -KOLE (r) CM (t) KOLE (c)      To walk in hospital room? 2  -KOLE (r) CM (t) KOLE (c)      AM-PAC 6 Clicks Score 11  -KOLE (r) CM (t)         Functional Assessment    Outcome Measure Options AM-PAC 6 Clicks Basic Mobility (PT)  -KOLE (r) CM (t) KOLE (c)        User Key  (r) = Recorded By, (t) = Taken By, (c) = Cosigned By    Initials Name Provider Type    KOLE Hurley, PT Physical Therapist    NAKIA Carroll, PT Physical Therapist    JR Maria De Jesus Witt, OT Occupational Therapist    AR Karley Silva, OT Occupational Therapist    CM Lilia Kendrick, PT Student PT Student                    Therapy Suggested Charges     Code   Minutes Charges    99786 (CPT®) Hc Pt Neuromusc Re Education Ea 15 Min      16744 (CPT®) Hc Pt Ther Proc Ea 15 Min 11 1    43551 (CPT®) Hc Gait Training Ea 15 Min      40541 (CPT®) Hc Pt Therapeutic Act Ea 15 Min 12 1    97907 (CPT®) Hc Pt Manual Therapy Ea 15 Min      84222 (CPT®) Hc Pt Iontophoresis Ea 15 Min      57651 (CPT®) Hc Pt Elec Stim Ea-Per 15 Min      41299 (CPT®) Hc Pt Ultrasound Ea 15 Min      49489 (CPT®) Hc Pt Self Care/Mgmt/Train Ea 15 Min      Total  23 2              PT Discharge Summary  Anticipated Discharge Disposition (PT): other (see comments) (Hospice)  Reason for Discharge:  Change in medical status  Outcomes Achieved: Unable to make functional progress toward goals at this time, Unable to tolerate or actively participate in therapy  Discharge Destination: other (comment) (Hospice)      Savanna Pereira, PT   7/5/2018

## 2018-07-05 NOTE — NURSING NOTE
Patient Name:  Perla Ivan  :  1929  DOS:  18    Hospital Problem List     * (Principal)Acute on chronic congestive heart failure (CMS/HCC)    Weakness    Fall    Atrial fibrillation, chronic (CMS/HCC)    Dementia    Essential hypertension    Hyperlipidemia    Hypothyroid    Pneumonia           Consult has been received.  Medical records have been reviewed. Patient with history of chronic afib, dementia, HTN who presented to the hospital with dyspnea thought to be secondary to PNA and CHF. Patient was agitated and confused when I walked in the room. I later found family who was concerned about recent cognitive decline so education was limited. I provided family with education handouts and our information.  Patient to be scheduled follow up in one week with her cardiologist  or with us post discharge. If she goes to rehab facility she can be seen in 2 weeks if appropriate. Per hospitalist notes, palliative care has also been consulted to discuss GOC.     Echo Results:  · Left ventricular wall thickness is consistent with mild concentric hypertrophy.  · Mild mitral valve regurgitation is present.  · Mild pulmonic valve regurgitation is present.  · Mild to moderate tricuspid valve regurgitation is present.  · Calculated right ventricular systolic pressure from tricuspid regurgitation is 39 mmHg.  · Left ventricular systolic function is normal. Estimated EF = 75%.  · There is no evidence of pericardial effusion.  · Mild pulmonary hypertension is present.  · MAC is present.  · The aortic valve exhibits sclerosis.  · Normal right ventricular cavity size, wall thickness, systolic function and septal motion noted.      Heart Failure Education    Signs / symptoms and Role of [facility] Valve [facility] and when to call        Atrial fibrillation / Atrial flutter:  Quality Measure    CHADS-VASc Score: 4    Anticoagulation for CHADS-VASc >/=2    Yes  Pradaxa per her cardiologist    Statin Therapy (for  patients with a history of CAD, CVA/TIA, PVA, DM)  N/A      Atrial fibrillation / Atrial flutter education:   Role of [facility] and when to call

## 2018-07-05 NOTE — PLAN OF CARE
Problem: Patient Care Overview  Goal: Interprofessional Rounds/Family Conf   07/04/18 1400   Interdisciplinary Rounds/Family Conf   Summary Patient is sitting up in a chair and she is working with PT/OT. She denies pain. No family here at this time. Palliative will continue to follow for goals and support.   Participants nursing

## 2018-07-05 NOTE — SIGNIFICANT NOTE
07/05/18 1358   SLP Deferred Reason   SLP Deferred Reason Unable to evaluate, medical status change  (Palliative now following. Hospice consulted. Family possibly pursuing comfort measures. Safest option is NPO given level of alertness, however if pt/family decide on comfort measures then SLP will f/u for comfort diet eval/adjustments as pt appropriate. )

## 2018-07-05 NOTE — PLAN OF CARE
Problem: Patient Care Overview  Goal: Plan of Care Review  Outcome: Ongoing (interventions implemented as appropriate)   07/05/18 0900   Coping/Psychosocial   Plan of Care Reviewed With patient   Plan of Care Review   Progress no change     SLP re-evaluation completed. Will continue to address dysphagia concerns. Was not able to fully assess pt's safety for swallowing level 2' alertness and cognitive status. Will f/u w/ instrumental if pt becomes appropriate (able to remain awake for exam) to r/o aspiration. REC: NPO, meds via alternate route. Oral care BID and PRN.  Please see note for further details and recommendations.

## 2018-07-05 NOTE — PROGRESS NOTES
Continued Stay Note  [facility]     Patient Name: Perla Ivan  MRN: [id number]  Today's Date: 7/5/2018    Admit Date: 7/2/2018          Discharge Plan     Row Name 07/05/18 1822       Plan    Plan Transfer to the [facility]    Patient/Family in Agreement with Plan yes    Plan Comments Hospice consult received.  Chart reviewed.  Admitted with cough, bilateral LE weakness, persistent fatigue, confusion and declining functional status.  Exacerbation CHF.  Acute respiratory failure with hypoxia.  Advanced dementia.  Multipel co-morbidities.  Extremely restless - naked in bed and trying to climb out of bed.  Dtr, Demi,  and other family member at bedside.  Seen and assessed by Bhakti Kong RN CHPN, [facility] Liaison.  Met with dtr.  Agreeable for transfer to the [facility] [place] for end of life care.  Medical records faxed.  Ambulance transport per [facility] 5/6/18 10:45AM.  EMS DNR elected per dtr.  Update to primary RN, RN Case Manager, HCP Hospital Liaison and the Palliative Care Team.    Will follow for hospice support and to assist/facilitate transfer to [facility] in the AM.  If can be of further assist please contact....ext [phone].              Discharge Codes    No documentation.           Sindy Ortez RN

## 2018-07-05 NOTE — PLAN OF CARE
Problem: Urinary Tract Infection (Adult)  Goal: Signs and Symptoms of Listed Potential Problems Will be Absent, Minimized or Managed (Urinary Tract Infection)  Outcome: Ongoing (interventions implemented as appropriate)   07/05/18 1704   Goal/Outcome Evaluation   Problems Assessed (Urinary Tract Infection) all   Problems Present (UTI) infection progression       Problem: Patient Care Overview  Goal: Plan of Care Review  Outcome: Ongoing (interventions implemented as appropriate)   07/05/18 1704   Coping/Psychosocial   Plan of Care Reviewed With patient   Plan of Care Review   Progress declining   OTHER   Outcome Summary patient was accepted to [facility] in [place] luc aware states he will discharge in the AM, on 4 liters nc,        Problem: Skin Injury Risk (Adult)  Goal: Identify Related Risk Factors and Signs and Symptoms  Outcome: Ongoing (interventions implemented as appropriate)   07/05/18 1704   Skin Injury Risk (Adult)   Related Risk Factors (Skin Injury Risk) advanced age     Goal: Skin Health and Integrity  Outcome: Ongoing (interventions implemented as appropriate)   07/05/18 1704   Skin Injury Risk (Adult)   Skin Health and Integrity making progress toward outcome       Problem: Fall Risk (Adult)  Goal: Identify Related Risk Factors and Signs and Symptoms  Outcome: Ongoing (interventions implemented as appropriate)   07/05/18 1704   Fall Risk (Adult)   Related Risk Factors (Fall Risk) confusion/agitation;environment unfamiliar;gait/mobility problems   Signs and Symptoms (Fall Risk) presence of risk factors     Goal: Absence of Fall  Outcome: Ongoing (interventions implemented as appropriate)   07/05/18 1704   Fall Risk (Adult)   Absence of Fall making progress toward outcome

## 2018-07-06 VITALS
TEMPERATURE: 98.1 F | OXYGEN SATURATION: 95 % | HEART RATE: 137 BPM | SYSTOLIC BLOOD PRESSURE: 153 MMHG | BODY MASS INDEX: 31.22 KG/M2 | DIASTOLIC BLOOD PRESSURE: 77 MMHG | WEIGHT: 182.9 LBS | HEIGHT: 64 IN | RESPIRATION RATE: 20 BRPM

## 2018-07-06 PROCEDURE — 25010000002 PIPERACILLIN SOD-TAZOBACTAM PER 1 G: Performed by: NURSE PRACTITIONER

## 2018-07-06 PROCEDURE — 99239 HOSP IP/OBS DSCHRG MGMT >30: CPT | Performed by: INTERNAL MEDICINE

## 2018-07-06 PROCEDURE — 94799 UNLISTED PULMONARY SVC/PX: CPT

## 2018-07-06 PROCEDURE — 94640 AIRWAY INHALATION TREATMENT: CPT

## 2018-07-06 PROCEDURE — 94760 N-INVAS EAR/PLS OXIMETRY 1: CPT

## 2018-07-06 RX ADMIN — IPRATROPIUM BROMIDE 0.5 MG: 0.5 SOLUTION RESPIRATORY (INHALATION) at 07:46

## 2018-07-06 RX ADMIN — TAZOBACTAM SODIUM AND PIPERACILLIN SODIUM 3.38 G: 375; 3 INJECTION, SOLUTION INTRAVENOUS at 01:06

## 2018-07-06 RX ADMIN — TAZOBACTAM SODIUM AND PIPERACILLIN SODIUM 3.38 G: 375; 3 INJECTION, SOLUTION INTRAVENOUS at 09:12

## 2018-07-06 NOTE — PROGRESS NOTES
Continued Stay Note  [facility]     Patient Name: Perla Ivan  MRN: [id number]  Today's Date: 7/6/2018    Admit Date: 7/2/2018          Discharge Plan     Row Name 07/06/18 1644       Plan    Plan Transfer to the [facility]     Patient/Family in Agreement with Plan yes    Final Discharge Disposition Code 70 - other health care institution not elsewhere defined    Final Note Transferred per [facility] to the [facility] for symptom management.  EMS DNR elected per dtr.  Bhakti Kong RN CHPN, [facility] Liaison at bedside.  Spoke to dtr. this AM.  Pt's mental status much improved.  Family elected to go forward with tranfer to [facility].  Update to RN Case Manager, Sirena Orozco.              Discharge Codes    No documentation.       Expected Discharge Date and Time     Expected Discharge Date Expected Discharge Time    Jul 6, 2018             Sindy Ortez RN

## 2018-07-06 NOTE — SIGNIFICANT NOTE
07/06/18 0928   SLP Deferred Reason   SLP Deferred Reason Routine  (Pt is discharging w/ hospice care this AM. Spoke to RN regarding comfort diet needs. No further need for ST evaluation. SLP will sign off, reconsult if further needs arise. )

## 2018-07-06 NOTE — DISCHARGE SUMMARY
[facility] Medicine Services  DISCHARGE SUMMARY    Patient Name: Perla Ivan  : 1929  MRN: [id number]    Date of Admission: 2018  Date of Discharge:  2018  Primary Care Physician: Kar Munoz DO    Consults     Date and Time Order Name Status Description    7/3/2018 0744 Inpatient Palliative Care MD Consult Completed         Hospital Course     Presenting Problem:   Chronic congestive heart failure, unspecified congestive heart failure type (CMS/HCC) [I50.9]    Active Hospital Problems    Diagnosis Date Noted   • **Acute on chronic congestive heart failure (CMS/HCC) [I50.9] 2018   • Weakness [R53.1] 2018   • Fall [W19.XXXA] 2018   • Atrial fibrillation, chronic (CMS/HCC) [I48.2] 2018   • Dementia [F03.90] 2018   • Essential hypertension [I10] 2018   • Hyperlipidemia [E78.5] 2018   • Hypothyroid [E03.9] 2018   • Pneumonia  [J18.9] 2018      Resolved Hospital Problems    Diagnosis Date Noted Date Resolved   No resolved problems to display.      Hospital Course:  Perla Ivan is a 88 y.o. female with hx of dementia, hypertension, Afib who presented with dyspnea likely sec to A/C chf and pna.Her acute respiratory failure with hypoxia etiology was likely multifactorial sec to a/c chf exacerbation and pneumonia, repeat cxr worsening for RML opacities she was placed on abx, duonebs, steroids, and continued diureses. She also had acute encephalopathy with agitation in the setting of advanced dementia and delirium.Her prognosis was deemed guarded if not poor considering her acute illness advanced age, dementia, lethargy, weakness, falls, and decreased functional status, palliative was consulted on GOC.Discussed with Daughter Vida who made patient DNR upon further discussions,family elected to place patient under hospice care, she is thus being d/c to [facility].    Day of Discharge      HPI: Patient remained restless last night, daughter is at bedside.      Review of Systems  UTO sec to mental status    Vital Signs:   Temp:  [97.9 °F (36.6 °C)-98.1 °F (36.7 °C)] 98.1 °F (36.7 °C)  Heart Rate:  [101-131] 131  Resp:  [20-24] 20  BP: (147-158)/() 153/77     Physical Exam:  Constitutional: No acute distress,restless  HENT: NCAT, mucous membranes dry  Respiratory: mod labored respirations, coarse BS  Cardiovascular: RRR, no murmurs, rubs, or gallops, palpable pedal pulses bilaterally  Gastrointestinal: Positive bowel sounds, soft, nontender, nondistended  Musculoskeletal: No bilateral ankle edema  Psychiatric:GRISEL  Neurologic:GRISEL    Skin: No rashes    Pertinent  and/or Most Recent Results       Results from last 7 days  Lab Units 07/05/18  0625 07/04/18  0603 07/03/18 2014 07/03/18  0435 07/02/18  1444 06/30/18  1605   WBC 10*3/mm3 14.28*  --   --  13.83* 13.29* 13.77*   HEMOGLOBIN g/dL 12.6  --   --  12.1 11.9 11.6*   HEMATOCRIT % 39.2  --   --  38.8 36.6 35.3*   PLATELETS 10*3/mm3 312  --   --  259 282 243   SODIUM mmol/L  --  139  --  138 140 136*   POTASSIUM mmol/L  --  3.8 3.9 3.2* 3.6 4.5   CHLORIDE mmol/L  --  101  --  99 103 102   CO2 mmol/L  --  28.0  --  29.0 28.0 28.0   BUN mg/dL  --  18  --  8* 11 15   CREATININE mg/dL  --  0.64  --  0.72 0.74 0.70   GLUCOSE mg/dL  --  137*  --  135* 127* 124*   CALCIUM mg/dL  --  8.6*  --  9.0 9.6 9.0       Results from last 7 days  Lab Units 07/02/18  1444 06/30/18  1605   BILIRUBIN mg/dL 1.0 0.8   ALK PHOS U/L 91 94   ALT (SGPT) U/L 10 21   AST (SGOT) U/L 17 23           Invalid input(s): TG, LDLCALC, LDLREALC    Results from last 7 days  Lab Units 07/03/18  0435 07/02/18  1444 06/30/18  1605   TSH mIU/mL 0.514  --   --    HEMOGLOBIN A1C % 6.10*  --   --    BNP pg/mL  --  394.0*  --    TROPONIN I ng/mL  --   --  <0.012     Brief Urine Lab Results  (Last result in the past 365 days)      Color   Clarity   Blood   Leuk Est   Nitrite   Protein    CREAT   Urine HCG        07/02/18 1444 Yellow Cloudy(A) Trace(A) Trace(A) Negative 30 mg/dL (1+)(A)               Microbiology Results Abnormal     Procedure Component Value - Date/Time    Blood Culture - Blood, [[id number]]  (Normal) Collected:  07/02/18 2022    Lab Status:  Preliminary result Specimen:  Blood from Arm, Left Updated:  07/05/18 2030     Blood Culture No growth at 3 days          Imaging Results (all)     Procedure Component Value Units Date/Time    XR Chest 1 View [[id number]] Collected:  07/04/18 1622     Updated:  07/05/18 0909    Narrative:       EXAMINATION: XR CHEST 1 VW - 7/4/2018     INDICATION: Dyspnea on exertion.     COMPARISON: 7/2/2018.     FINDINGS: Portable chest reveals heart to be enlarged. Increased  pulmonary vascularity bilaterally. Mild increased markings identified  lung bases. Small bilateral pleural effusions. Degenerative changes seen  within the spine.           Impression:       Heart is enlarged with increased pulmonary vascularity  bilaterally. Degenerative changes identified within the spine. Findings  have somewhat improved in the interval.     DICTATED:   7/4/2018  EDITED/ls :   7/4/2018      This report was finalized on 7/5/2018 9:07 AM by Dr. Hattie Kruse MD.       XR Chest 1 View [[id number]] Collected:  07/05/18 0010     Updated:  07/05/18 0040    Narrative:       EXAM:    XR Chest, 1 View    CLINICAL HISTORY:    88 years old, female; Heart failure, unspecified; Hypoxemia; Weakness; Other   malaise; Other reduced mobility; Other reduced mobility; Personal history of   other diseases of the circulatory system; Dependence on supplemental oxygen;   Signs and symptoms; Other: Decrease in breath sounds; Patient HX: Decrease in   breath sounds, combative behavior HX: Chf, atrial fibrillation, dementia, HTN,   pneumonia; Additional info: Decreased breath sounds    TECHNIQUE:    Frontal view of the chest.    COMPARISON:    CR - XR CHEST 1 VW 2018-07-04  03:23    FINDINGS:    Lungs:  Pulmonary vascular congestion with hilar haziness and scattered   patchy foci of hazy opacity in the right lung and medially in the left upper   lung field, slightly increased on the right.    Pleural space:  Unremarkable.  No pneumothorax.    Heart:  Mildly enlarged.    Mediastinum:  Unremarkable.    Bones/joints:  Unremarkable.      Impression:         Increased hazy opacity in the right lung field may represent pneumonia.    THIS DOCUMENT HAS BEEN ELECTRONICALLY SIGNED BY SHAYLA MCGILL MD    CT Head Without Contrast [[id number]] Collected:  07/02/18 1655     Updated:  07/03/18 0827    Narrative:       EXAMINATION: CT HEAD WO CONTRAST- 07/02/2018     INDICATION: Multiple falls on anticoagulation; headache     TECHNIQUE: Multiple axial CT imaging was obtained of the head from skull  base to skull vertex without the administration of intravenous contrast.     The radiation dose reduction device was turned on for each scan per the  ALARA (As Low as Reasonably Achievable) protocol.     COMPARISON: 04/04/2018     FINDINGS: Extensive vascular calcification identified again along the  right paramedian suprasellar cistern. This area is stable and unchanged  when compared to the prior study. There is some low-density area seen in  the periventricular and subcortical white matter suggesting chronic  small vessel ischemic change. Atrophy identified of the brain. No  hemorrhage or hydrocephalus. No mass, mass effect, or midline shift.  Bony structures reveal no evidence of osseous abnormality. The  visualized paranasal sinuses are clear. The mastoid air cells are  patent.       Impression:       Stable calcification seen again at the right paramedian  suprasellar cistern suggesting possible vascular calcification.  Extensive chronic changes seen within the brain with no acute  intracranial abnormality present.     D:  07/02/2018  E:  07/02/2018        This report was finalized on 7/3/2018 8:24 AM  by Dr. Hattie Kruse MD.       XR Chest 2 View [[id number]] Collected:  07/02/18 1540     Updated:  07/02/18 1551    Narrative:       EXAMINATION: XR CHEST 2 VW- 07/02/2018     INDICATION: AMS and cough      COMPARISON: 04/04/2018     FINDINGS: Two-view chest reveals heart to be enlarged. Increased  pulmonary vascularity bilaterally with patchy ill-defined opacification  lung bases. Small bilateral pleural effusions identified. Increased  pulmonary vascularity bilaterally.           Impression:       Heart is enlarged with increased pulmonary vascularity  bilaterally and ill-defined opacification seen in the lung bases and  small bilateral pleural effusions.     D:  07/02/2018  E:  07/02/2018     This report was finalized on 7/2/2018 3:49 PM by Dr. Hattie Kruse MD.             Results for orders placed during the hospital encounter of 07/02/18   Adult Transthoracic Echo Complete W/ Cont if Necessary Per Protocol    Narrative · Left ventricular wall thickness is consistent with mild concentric   hypertrophy.  · Mild mitral valve regurgitation is present.  · Mild pulmonic valve regurgitation is present.  · Mild to moderate tricuspid valve regurgitation is present.  · Calculated right ventricular systolic pressure from tricuspid   regurgitation is 39 mmHg.  · Left ventricular systolic function is normal. Estimated EF = 75%.  · There is no evidence of pericardial effusion.  · Mild pulmonary hypertension is present.  · MAC is present.  · The aortic valve exhibits sclerosis.  · Normal right ventricular cavity size, wall thickness, systolic function   and septal motion noted.           Order Current Status    Blood Culture - Blood, Preliminary result        Discharge Details        Discharge Medications      Continue These Medications      Instructions Start Date   ALPRAZolam 0.25 MG tablet  Commonly known as:  XANAX   0.25 mg, Oral, Nightly PRN      carvedilol 6.25 MG tablet  Commonly known as:  COREG   6.25  mg, Oral, 2 Times Daily      citalopram 20 MG tablet  Commonly known as:  CeleXA   20 mg, Oral, Daily      diltiazem  MG 24 hr capsule  Commonly known as:  DILACOR XR   180 mg, Oral, Daily      fluocinonide 0.05 % cream  Commonly known as:  LIDEX   1 application, Topical, 2 Times Daily      furosemide 40 MG tablet  Commonly known as:  LASIX   40 mg, Oral, Daily      levothyroxine 50 MCG tablet  Commonly known as:  SYNTHROID, LEVOTHROID   50 mcg, Oral, Daily      losartan 50 MG tablet  Commonly known as:  COZAAR   50 mg, Oral, Daily      meclizine 25 MG tablet  Commonly known as:  ANTIVERT   25 mg, Oral, 4 Times Daily PRN      pilocarpine 1 % ophthalmic solution  Commonly known as:  PILOCAR   1 drop, 4 Times Daily, One eye but family can't remember      potassium chloride ER 20 MEQ tablet controlled-release ER tablet  Commonly known as:  K-TAB   20 mEq, Oral, Daily, Take with Furosemide      PRADAXA 150 MG capsu  Generic drug:  dabigatran etexilate   150 mg, Oral, Daily      pravastatin 40 MG tablet  Commonly known as:  PRAVACHOL   20 mg, Oral, Daily      raNITIdine 300 MG tablet  Commonly known as:  ZANTAC   300 mg, Oral, 2 Times Daily PRN             Discharge Disposition:  Hospice/Medical Facility (DC - External)    Discharge Diet:  Diet Instructions     Advance Diet As Tolerated             Discharge Activity:   Activity Instructions     Activity as Tolerated             Special Instructions: None      Code Status/Level of Support:  Code Status and Medical Interventions:   Ordered at: 07/05/18 1216     Limited Support to NOT Include:    NIPPV (Non-Invasive Positive Pressure Support)    Cardioversion/Defibrillation    Dialysis    Intubation    Antiarrhythmic Drugs    Vasopressors     Code Status:    No CPR     Medical Interventions (Level of Support Prior to Arrest):    Limited       No future appointments.      Time Spent on Discharge:  40 minutes    Electronically signed by Raulito Stratton MD, 07/06/18,  7:14 AM.

## 2018-07-07 LAB — BACTERIA SPEC AEROBE CULT: NORMAL

## 2022-06-20 NOTE — PLAN OF CARE
Problem: Urinary Tract Infection (Adult)  Goal: Signs and Symptoms of Listed Potential Problems Will be Absent, Minimized or Managed (Urinary Tract Infection)  Outcome: Ongoing (interventions implemented as appropriate)   07/04/18 1830   Goal/Outcome Evaluation   Problems Assessed (Urinary Tract Infection) all   Problems Present (UTI) infection progression       Problem: Patient Care Overview  Goal: Plan of Care Review  Outcome: Ongoing (interventions implemented as appropriate)   07/04/18 1830   Coping/Psychosocial   Plan of Care Reviewed With patient   Plan of Care Review   Progress no change   OTHER   Outcome Summary patient became super agitated this evening trying to get out of bed, pulled out her iv, states she is ready to go home and thinks she is at her neighbors house        Problem: Skin Injury Risk (Adult)  Goal: Identify Related Risk Factors and Signs and Symptoms  Outcome: Ongoing (interventions implemented as appropriate)   07/04/18 1830   Skin Injury Risk (Adult)   Related Risk Factors (Skin Injury Risk) advanced age     Goal: Skin Health and Integrity  Outcome: Ongoing (interventions implemented as appropriate)   07/04/18 1830   Skin Injury Risk (Adult)   Skin Health and Integrity making progress toward outcome       Problem: Fall Risk (Adult)  Goal: Identify Related Risk Factors and Signs and Symptoms  Outcome: Ongoing (interventions implemented as appropriate)   07/04/18 1830   Fall Risk (Adult)   Related Risk Factors (Fall Risk) confusion/agitation;gait/mobility problems;environment unfamiliar   Signs and Symptoms (Fall Risk) presence of risk factors     Goal: Absence of Fall  Outcome: Ongoing (interventions implemented as appropriate)   07/04/18 1830   Fall Risk (Adult)   Absence of Fall making progress toward outcome          Attending to bill